# Patient Record
Sex: MALE | Race: WHITE | Employment: OTHER | URBAN - METROPOLITAN AREA
[De-identification: names, ages, dates, MRNs, and addresses within clinical notes are randomized per-mention and may not be internally consistent; named-entity substitution may affect disease eponyms.]

---

## 2023-06-19 ENCOUNTER — HOSPITAL ENCOUNTER (INPATIENT)
Age: 63
LOS: 3 days | Discharge: ANOTHER HOSPITAL | End: 2023-06-23
Attending: STUDENT IN AN ORGANIZED HEALTH CARE EDUCATION/TRAINING PROGRAM | Admitting: INTERNAL MEDICINE

## 2023-06-19 ENCOUNTER — APPOINTMENT (OUTPATIENT)
Dept: CT IMAGING | Age: 63
End: 2023-06-19

## 2023-06-19 ENCOUNTER — APPOINTMENT (OUTPATIENT)
Dept: GENERAL RADIOLOGY | Age: 63
End: 2023-06-19

## 2023-06-19 DIAGNOSIS — R65.21 SEPTIC SHOCK (HCC): ICD-10-CM

## 2023-06-19 DIAGNOSIS — G93.40 ACUTE ENCEPHALOPATHY: ICD-10-CM

## 2023-06-19 DIAGNOSIS — E87.20 LACTIC ACIDOSIS: ICD-10-CM

## 2023-06-19 DIAGNOSIS — J18.9 ATYPICAL PNEUMONIA: ICD-10-CM

## 2023-06-19 DIAGNOSIS — A41.9 SEPTIC SHOCK (HCC): ICD-10-CM

## 2023-06-19 DIAGNOSIS — N17.0 ACUTE RENAL FAILURE WITH TUBULAR NECROSIS (HCC): Primary | ICD-10-CM

## 2023-06-19 DIAGNOSIS — D69.6 THROMBOCYTOPENIA (HCC): ICD-10-CM

## 2023-06-19 LAB
AMORPH SED URNS QL MICRO: ABNORMAL /HPF
APTT BLD: 22.3 SEC (ref 22.7–35.9)
BACTERIA URNS QL MICRO: ABNORMAL /HPF
BASE EXCESS BLDV CALC-SCNC: -2 MMOL/L (ref -3–3)
BILIRUB UR QL STRIP.AUTO: NEGATIVE
CLARITY UR: CLEAR
CO2 BLDV-SCNC: 22 MMOL/L
COARSE GRAN CASTS #/AREA URNS LPF: ABNORMAL /LPF (ref 0–2)
COHGB MFR BLDV: 5.5 % (ref 0–1.5)
COLOR UR: YELLOW
EPI CELLS #/AREA URNS HPF: ABNORMAL /HPF (ref 0–5)
ETHANOLAMINE SERPL-MCNC: NORMAL MG/DL (ref 0–0.08)
FLUAV RNA RESP QL NAA+PROBE: NOT DETECTED
FLUBV RNA RESP QL NAA+PROBE: NOT DETECTED
GLUCOSE BLD-MCNC: 99 MG/DL (ref 70–99)
GLUCOSE UR STRIP.AUTO-MCNC: NEGATIVE MG/DL
HCO3 BLDV-SCNC: 20.6 MMOL/L (ref 23–29)
HGB UR QL STRIP.AUTO: ABNORMAL
INR PPP: 1.06 (ref 0.84–1.16)
KETONES UR STRIP.AUTO-MCNC: NEGATIVE MG/DL
LACTATE BLDV-SCNC: 6.8 MMOL/L (ref 0.4–1.9)
LEUKOCYTE ESTERASE UR QL STRIP.AUTO: ABNORMAL
LIPASE SERPL-CCNC: 20 U/L (ref 13–60)
METHGB MFR BLDV: 0.3 %
NITRITE UR QL STRIP.AUTO: NEGATIVE
NT-PROBNP SERPL-MCNC: 7201 PG/ML (ref 0–124)
O2 THERAPY: ABNORMAL
PCO2 BLDV: 29.4 MMHG (ref 40–50)
PERFORMED ON: NORMAL
PH BLDV: 7.46 [PH] (ref 7.35–7.45)
PH UR STRIP.AUTO: 5.5 [PH] (ref 5–8)
PO2 BLDV: 70.2 MMHG (ref 25–40)
PROCALCITONIN SERPL IA-MCNC: 30.23 NG/ML (ref 0–0.15)
PROT UR STRIP.AUTO-MCNC: >=300 MG/DL
PROTHROMBIN TIME: 13.8 SEC (ref 11.5–14.8)
RBC #/AREA URNS HPF: ABNORMAL /HPF (ref 0–4)
REASON FOR REJECTION: NORMAL
REJECTED TEST: NORMAL
SAO2 % BLDV: 94 %
SARS-COV-2 RNA RESP QL NAA+PROBE: NOT DETECTED
SP GR UR STRIP.AUTO: >=1.03 (ref 1–1.03)
TROPONIN, HIGH SENSITIVITY: 50 NG/L (ref 0–22)
UA COMPLETE W REFLEX CULTURE PNL UR: ABNORMAL
UA DIPSTICK W REFLEX MICRO PNL UR: YES
URN SPEC COLLECT METH UR: ABNORMAL
UROBILINOGEN UR STRIP-ACNC: 0.2 E.U./DL
WBC #/AREA URNS HPF: ABNORMAL /HPF (ref 0–5)

## 2023-06-19 PROCEDURE — 93005 ELECTROCARDIOGRAM TRACING: CPT | Performed by: STUDENT IN AN ORGANIZED HEALTH CARE EDUCATION/TRAINING PROGRAM

## 2023-06-19 PROCEDURE — 96375 TX/PRO/DX INJ NEW DRUG ADDON: CPT

## 2023-06-19 PROCEDURE — 96365 THER/PROPH/DIAG IV INF INIT: CPT

## 2023-06-19 PROCEDURE — 87077 CULTURE AEROBIC IDENTIFY: CPT

## 2023-06-19 PROCEDURE — 82803 BLOOD GASES ANY COMBINATION: CPT

## 2023-06-19 PROCEDURE — 82077 ASSAY SPEC XCP UR&BREATH IA: CPT

## 2023-06-19 PROCEDURE — 83690 ASSAY OF LIPASE: CPT

## 2023-06-19 PROCEDURE — 84145 PROCALCITONIN (PCT): CPT

## 2023-06-19 PROCEDURE — 87040 BLOOD CULTURE FOR BACTERIA: CPT

## 2023-06-19 PROCEDURE — 99285 EMERGENCY DEPT VISIT HI MDM: CPT

## 2023-06-19 PROCEDURE — 83880 ASSAY OF NATRIURETIC PEPTIDE: CPT

## 2023-06-19 PROCEDURE — 85730 THROMBOPLASTIN TIME PARTIAL: CPT

## 2023-06-19 PROCEDURE — 71045 X-RAY EXAM CHEST 1 VIEW: CPT

## 2023-06-19 PROCEDURE — 87181 SC STD AGAR DILUTION PER AGT: CPT

## 2023-06-19 PROCEDURE — 83605 ASSAY OF LACTIC ACID: CPT

## 2023-06-19 PROCEDURE — 87150 DNA/RNA AMPLIFIED PROBE: CPT

## 2023-06-19 PROCEDURE — 85025 COMPLETE CBC W/AUTO DIFF WBC: CPT

## 2023-06-19 PROCEDURE — 70470 CT HEAD/BRAIN W/O & W/DYE: CPT

## 2023-06-19 PROCEDURE — 87449 NOS EACH ORGANISM AG IA: CPT

## 2023-06-19 PROCEDURE — 96376 TX/PRO/DX INJ SAME DRUG ADON: CPT

## 2023-06-19 PROCEDURE — 81001 URINALYSIS AUTO W/SCOPE: CPT

## 2023-06-19 PROCEDURE — 80053 COMPREHEN METABOLIC PANEL: CPT

## 2023-06-19 PROCEDURE — 87636 SARSCOV2 & INF A&B AMP PRB: CPT

## 2023-06-19 PROCEDURE — 84484 ASSAY OF TROPONIN QUANT: CPT

## 2023-06-19 PROCEDURE — 87186 SC STD MICRODIL/AGAR DIL: CPT

## 2023-06-19 PROCEDURE — 85610 PROTHROMBIN TIME: CPT

## 2023-06-19 RX ORDER — ONDANSETRON 2 MG/ML
4 INJECTION INTRAMUSCULAR; INTRAVENOUS ONCE
Status: COMPLETED | OUTPATIENT
Start: 2023-06-19 | End: 2023-06-20

## 2023-06-19 RX ORDER — ACETAMINOPHEN 500 MG
1000 TABLET ORAL ONCE
Status: COMPLETED | OUTPATIENT
Start: 2023-06-19 | End: 2023-06-20

## 2023-06-19 RX ORDER — SODIUM CHLORIDE, SODIUM LACTATE, POTASSIUM CHLORIDE, AND CALCIUM CHLORIDE .6; .31; .03; .02 G/100ML; G/100ML; G/100ML; G/100ML
2500 INJECTION, SOLUTION INTRAVENOUS ONCE
Status: COMPLETED | OUTPATIENT
Start: 2023-06-19 | End: 2023-06-20

## 2023-06-19 ASSESSMENT — PAIN - FUNCTIONAL ASSESSMENT: PAIN_FUNCTIONAL_ASSESSMENT: NONE - DENIES PAIN

## 2023-06-20 PROBLEM — R45.1 AGITATION REQUIRING SEDATION PROTOCOL: Status: ACTIVE | Noted: 2023-06-20

## 2023-06-20 PROBLEM — R65.21 SEPTIC SHOCK (HCC): Status: ACTIVE | Noted: 2023-06-20

## 2023-06-20 PROBLEM — J96.01 ACUTE RESPIRATORY FAILURE WITH HYPOXIA (HCC): Status: ACTIVE | Noted: 2023-06-20

## 2023-06-20 PROBLEM — B95.3 BACTEREMIA DUE TO STREPTOCOCCUS PNEUMONIAE: Status: ACTIVE | Noted: 2023-06-20

## 2023-06-20 PROBLEM — R91.8 PULMONARY INFILTRATES: Status: ACTIVE | Noted: 2023-06-20

## 2023-06-20 PROBLEM — J13 PNEUMONIA OF BOTH LUNGS DUE TO STREPTOCOCCUS PNEUMONIAE (HCC): Status: ACTIVE | Noted: 2023-06-20

## 2023-06-20 PROBLEM — G93.40 ACUTE ENCEPHALOPATHY: Status: ACTIVE | Noted: 2023-06-20

## 2023-06-20 PROBLEM — R78.81 BACTEREMIA DUE TO STREPTOCOCCUS PNEUMONIAE: Status: ACTIVE | Noted: 2023-06-20

## 2023-06-20 PROBLEM — E44.0 MODERATE PROTEIN-CALORIE MALNUTRITION (HCC): Status: ACTIVE | Noted: 2023-06-20

## 2023-06-20 PROBLEM — N17.9 AKI (ACUTE KIDNEY INJURY) (HCC): Status: ACTIVE | Noted: 2023-06-20

## 2023-06-20 PROBLEM — N17.0 ACUTE RENAL FAILURE WITH TUBULAR NECROSIS (HCC): Status: ACTIVE | Noted: 2023-06-20

## 2023-06-20 PROBLEM — A41.9 SEPSIS WITH ACUTE ORGAN DYSFUNCTION WITHOUT SEPTIC SHOCK, DUE TO UNSPECIFIED ORGANISM, UNSPECIFIED TYPE (HCC): Status: ACTIVE | Noted: 2023-06-20

## 2023-06-20 PROBLEM — D69.6 THROMBOCYTOPENIA (HCC): Status: ACTIVE | Noted: 2023-06-20

## 2023-06-20 PROBLEM — R74.02 ELEVATED LDH: Status: ACTIVE | Noted: 2023-06-20

## 2023-06-20 PROBLEM — R79.89 ELEVATED PROCALCITONIN: Status: ACTIVE | Noted: 2023-06-20

## 2023-06-20 PROBLEM — R65.20 SEPSIS WITH ACUTE ORGAN DYSFUNCTION WITHOUT SEPTIC SHOCK, DUE TO UNSPECIFIED ORGANISM, UNSPECIFIED TYPE (HCC): Status: ACTIVE | Noted: 2023-06-20

## 2023-06-20 PROBLEM — E87.20 LACTIC ACIDOSIS: Status: ACTIVE | Noted: 2023-06-20

## 2023-06-20 PROBLEM — D50.9 MICROCYTIC HYPOCHROMIC ANEMIA: Status: ACTIVE | Noted: 2023-06-20

## 2023-06-20 PROBLEM — R79.89 ELEVATED BRAIN NATRIURETIC PEPTIDE (BNP) LEVEL: Status: ACTIVE | Noted: 2023-06-20

## 2023-06-20 LAB
ALBUMIN SERPL-MCNC: 2.5 G/DL (ref 3.4–5)
ALBUMIN SERPL-MCNC: 2.7 G/DL (ref 3.4–5)
ALBUMIN/GLOB SERPL: 0.4 {RATIO} (ref 1.1–2.2)
ALP SERPL-CCNC: 93 U/L (ref 40–129)
ALT SERPL-CCNC: 18 U/L (ref 10–40)
AMMONIA PLAS-SCNC: 87 UMOL/L (ref 16–60)
ANION GAP SERPL CALCULATED.3IONS-SCNC: 16 MMOL/L (ref 3–16)
ANION GAP SERPL CALCULATED.3IONS-SCNC: 22 MMOL/L (ref 3–16)
APPEARANCE BRONCH: ABNORMAL
AST SERPL-CCNC: 64 U/L (ref 15–37)
BASOPHILS # BLD: 0 K/UL (ref 0–0.2)
BASOPHILS NFR BLD: 0.2 %
BILIRUB SERPL-MCNC: 1.2 MG/DL (ref 0–1)
BUN SERPL-MCNC: 52 MG/DL (ref 7–20)
BUN SERPL-MCNC: 53 MG/DL (ref 7–20)
CALCIUM SERPL-MCNC: 10.1 MG/DL (ref 8.3–10.6)
CALCIUM SERPL-MCNC: 9.1 MG/DL (ref 8.3–10.6)
CHLORIDE SERPL-SCNC: 92 MMOL/L (ref 99–110)
CHLORIDE SERPL-SCNC: 98 MMOL/L (ref 99–110)
CLOT SPEC QL: ABNORMAL
CO2 SERPL-SCNC: 19 MMOL/L (ref 21–32)
CO2 SERPL-SCNC: 23 MMOL/L (ref 21–32)
COLOR BRONCH: ABNORMAL
CREAT SERPL-MCNC: 1.6 MG/DL (ref 0.8–1.3)
CREAT SERPL-MCNC: 1.6 MG/DL (ref 0.8–1.3)
DEPRECATED RDW RBC AUTO: 18.9 % (ref 12.4–15.4)
EKG ATRIAL RATE: 105 BPM
EKG DIAGNOSIS: NORMAL
EKG P AXIS: 85 DEGREES
EKG P-R INTERVAL: 142 MS
EKG Q-T INTERVAL: 330 MS
EKG QRS DURATION: 86 MS
EKG QTC CALCULATION (BAZETT): 436 MS
EKG R AXIS: 69 DEGREES
EKG T AXIS: 66 DEGREES
EKG VENTRICULAR RATE: 105 BPM
EOSINOPHIL # BLD: 0 K/UL (ref 0–0.6)
EOSINOPHIL NFR BLD: 0 %
FERRITIN SERPL IA-MCNC: 4633 NG/ML (ref 30–400)
FOLATE SERPL-MCNC: 12.13 NG/ML (ref 4.78–24.2)
GFR SERPLBLD CREATININE-BSD FMLA CKD-EPI: 48 ML/MIN/{1.73_M2}
GFR SERPLBLD CREATININE-BSD FMLA CKD-EPI: 48 ML/MIN/{1.73_M2}
GLUCOSE BLD-MCNC: 152 MG/DL (ref 70–99)
GLUCOSE SERPL-MCNC: 106 MG/DL (ref 70–99)
GLUCOSE SERPL-MCNC: 94 MG/DL (ref 70–99)
HCT VFR BLD AUTO: 30.6 % (ref 40.5–52.5)
HCT VFR BLD AUTO: 35.3 % (ref 40.5–52.5)
HGB BLD-MCNC: 11.4 G/DL (ref 13.5–17.5)
IMMATURE RETIC FRACT: 0.35 (ref 0.21–0.37)
INR PPP: 1.3 (ref 0.84–1.16)
IRON SATN MFR SERPL: 10 % (ref 20–50)
IRON SERPL-MCNC: 21 UG/DL (ref 59–158)
LACTATE BLDV-SCNC: 0.3 MMOL/L (ref 0.4–1.9)
LACTATE BLDV-SCNC: 2.7 MMOL/L (ref 0.4–2)
LACTATE BLDV-SCNC: 3.3 MMOL/L (ref 0.4–2)
LACTATE BLDV-SCNC: 4.5 MMOL/L (ref 0.4–2)
LDH SERPL L TO P-CCNC: 609 U/L (ref 100–190)
LEGIONELLA AG UR QL: NORMAL
LYMPHOCYTES # BLD: 0.2 K/UL (ref 1–5.1)
LYMPHOCYTES NFR BLD: 2.1 %
LYMPHOCYTES NFR BRONCH MANUAL: 1 % (ref 5–10)
MACROPHAGES NFR BRONCH MANUAL: 7 % (ref 90–95)
MAGNESIUM SERPL-MCNC: 2.4 MG/DL (ref 1.8–2.4)
MCH RBC QN AUTO: 23.2 PG (ref 26–34)
MCHC RBC AUTO-ENTMCNC: 32.2 G/DL (ref 31–36)
MCV RBC AUTO: 72 FL (ref 80–100)
MONOCYTES # BLD: 0 K/UL (ref 0–1.3)
MONOCYTES NFR BLD: 0.3 %
NEUTROPHILS # BLD: 7 K/UL (ref 1.7–7.7)
NEUTROPHILS NFR BLD: 97.4 %
NEUTROPHILS NFR BRONCH MANUAL: 92 % (ref 5–10)
NUC CELL # BRONCH MANUAL: 2800 /CUMM
PATH REV: YES
PERFORMED ON: ABNORMAL
PHOSPHATE SERPL-MCNC: 3.7 MG/DL (ref 2.5–4.9)
PHOSPHATE SERPL-MCNC: 3.9 MG/DL (ref 2.5–4.9)
PLATELET # BLD AUTO: 61 K/UL (ref 135–450)
PLATELET BLD QL SMEAR: ABNORMAL
PMV BLD AUTO: 9.3 FL (ref 5–10.5)
POTASSIUM SERPL-SCNC: 3.3 MMOL/L (ref 3.5–5.1)
POTASSIUM SERPL-SCNC: 4 MMOL/L (ref 3.5–5.1)
PROT SERPL-MCNC: 9.1 G/DL (ref 6.4–8.2)
PROTHROMBIN TIME: 16.1 SEC (ref 11.5–14.8)
RBC # BLD AUTO: 4.91 M/UL (ref 4.2–5.9)
RBC # FLD MANUAL: 356 /CUMM
REPORT: NORMAL
RETICS # AUTO: 0.02 M/UL
RETICS/RBC NFR AUTO: 0.57 % (ref 0.5–2.18)
S PNEUM AG UR QL: ABNORMAL
SLIDE REVIEW: ABNORMAL
SODIUM SERPL-SCNC: 133 MMOL/L (ref 136–145)
SODIUM SERPL-SCNC: 137 MMOL/L (ref 136–145)
TIBC SERPL-MCNC: 209 UG/DL (ref 260–445)
TOTAL CELLS COUNTED BRONCH: 100
TROPONIN, HIGH SENSITIVITY: 53 NG/L (ref 0–22)
TSH SERPL DL<=0.005 MIU/L-ACNC: 0.59 UIU/ML (ref 0.27–4.2)
VIT B12 SERPL-MCNC: 411 PG/ML (ref 211–911)
WBC # BLD AUTO: 7.2 K/UL (ref 4–11)

## 2023-06-20 PROCEDURE — 82140 ASSAY OF AMMONIA: CPT

## 2023-06-20 PROCEDURE — 82728 ASSAY OF FERRITIN: CPT

## 2023-06-20 PROCEDURE — 84443 ASSAY THYROID STIM HORMONE: CPT

## 2023-06-20 PROCEDURE — 89051 BODY FLUID CELL COUNT: CPT

## 2023-06-20 PROCEDURE — 87205 SMEAR GRAM STAIN: CPT

## 2023-06-20 PROCEDURE — 6360000002 HC RX W HCPCS: Performed by: INTERNAL MEDICINE

## 2023-06-20 PROCEDURE — 36415 COLL VENOUS BLD VENIPUNCTURE: CPT

## 2023-06-20 PROCEDURE — 3609010800 HC BRONCHOSCOPY ALVEOLAR LAVAGE: Performed by: INTERNAL MEDICINE

## 2023-06-20 PROCEDURE — 0B968ZX DRAINAGE OF RIGHT LOWER LOBE BRONCHUS, VIA NATURAL OR ARTIFICIAL OPENING ENDOSCOPIC, DIAGNOSTIC: ICD-10-PCS | Performed by: INTERNAL MEDICINE

## 2023-06-20 PROCEDURE — C9113 INJ PANTOPRAZOLE SODIUM, VIA: HCPCS | Performed by: INTERNAL MEDICINE

## 2023-06-20 PROCEDURE — 83735 ASSAY OF MAGNESIUM: CPT

## 2023-06-20 PROCEDURE — 87632 RESP VIRUS 6-11 TARGETS: CPT

## 2023-06-20 PROCEDURE — 99223 1ST HOSP IP/OBS HIGH 75: CPT | Performed by: INTERNAL MEDICINE

## 2023-06-20 PROCEDURE — 51702 INSERT TEMP BLADDER CATH: CPT

## 2023-06-20 PROCEDURE — 87206 SMEAR FLUORESCENT/ACID STAI: CPT

## 2023-06-20 PROCEDURE — 2500000003 HC RX 250 WO HCPCS: Performed by: STUDENT IN AN ORGANIZED HEALTH CARE EDUCATION/TRAINING PROGRAM

## 2023-06-20 PROCEDURE — 87116 MYCOBACTERIA CULTURE: CPT

## 2023-06-20 PROCEDURE — 93010 ELECTROCARDIOGRAM REPORT: CPT | Performed by: INTERNAL MEDICINE

## 2023-06-20 PROCEDURE — 87106 FUNGI IDENTIFICATION YEAST: CPT

## 2023-06-20 PROCEDURE — 83540 ASSAY OF IRON: CPT

## 2023-06-20 PROCEDURE — 3609010900 HC BRONCHOSCOPY THERAPUTIC ASPIRATION INITIAL: Performed by: INTERNAL MEDICINE

## 2023-06-20 PROCEDURE — 84484 ASSAY OF TROPONIN QUANT: CPT

## 2023-06-20 PROCEDURE — 2500000003 HC RX 250 WO HCPCS: Performed by: INTERNAL MEDICINE

## 2023-06-20 PROCEDURE — 80069 RENAL FUNCTION PANEL: CPT

## 2023-06-20 PROCEDURE — 87070 CULTURE OTHR SPECIMN AEROBIC: CPT

## 2023-06-20 PROCEDURE — 2700000000 HC OXYGEN THERAPY PER DAY

## 2023-06-20 PROCEDURE — 83615 LACTATE (LD) (LDH) ENZYME: CPT

## 2023-06-20 PROCEDURE — 85045 AUTOMATED RETICULOCYTE COUNT: CPT

## 2023-06-20 PROCEDURE — 94761 N-INVAS EAR/PLS OXIMETRY MLT: CPT

## 2023-06-20 PROCEDURE — 87102 FUNGUS ISOLATION CULTURE: CPT

## 2023-06-20 PROCEDURE — 83550 IRON BINDING TEST: CPT

## 2023-06-20 PROCEDURE — 84100 ASSAY OF PHOSPHORUS: CPT

## 2023-06-20 PROCEDURE — 82607 VITAMIN B-12: CPT

## 2023-06-20 PROCEDURE — 6360000002 HC RX W HCPCS: Performed by: STUDENT IN AN ORGANIZED HEALTH CARE EDUCATION/TRAINING PROGRAM

## 2023-06-20 PROCEDURE — 2580000003 HC RX 258: Performed by: INTERNAL MEDICINE

## 2023-06-20 PROCEDURE — 88305 TISSUE EXAM BY PATHOLOGIST: CPT

## 2023-06-20 PROCEDURE — 87641 MR-STAPH DNA AMP PROBE: CPT

## 2023-06-20 PROCEDURE — 6370000000 HC RX 637 (ALT 250 FOR IP): Performed by: INTERNAL MEDICINE

## 2023-06-20 PROCEDURE — 6360000004 HC RX CONTRAST MEDICATION: Performed by: STUDENT IN AN ORGANIZED HEALTH CARE EDUCATION/TRAINING PROGRAM

## 2023-06-20 PROCEDURE — 88112 CYTOPATH CELL ENHANCE TECH: CPT

## 2023-06-20 PROCEDURE — 83605 ASSAY OF LACTIC ACID: CPT

## 2023-06-20 PROCEDURE — 2709999900 HC NON-CHARGEABLE SUPPLY: Performed by: INTERNAL MEDICINE

## 2023-06-20 PROCEDURE — 86360 T CELL ABSOLUTE COUNT/RATIO: CPT

## 2023-06-20 PROCEDURE — 82746 ASSAY OF FOLIC ACID SERUM: CPT

## 2023-06-20 PROCEDURE — 87536 HIV-1 QUANT&REVRSE TRNSCRPJ: CPT

## 2023-06-20 PROCEDURE — 6370000000 HC RX 637 (ALT 250 FOR IP): Performed by: STUDENT IN AN ORGANIZED HEALTH CARE EDUCATION/TRAINING PROGRAM

## 2023-06-20 PROCEDURE — 2580000003 HC RX 258: Performed by: STUDENT IN AN ORGANIZED HEALTH CARE EDUCATION/TRAINING PROGRAM

## 2023-06-20 PROCEDURE — 2000000000 HC ICU R&B

## 2023-06-20 PROCEDURE — 99291 CRITICAL CARE FIRST HOUR: CPT | Performed by: INTERNAL MEDICINE

## 2023-06-20 PROCEDURE — 85610 PROTHROMBIN TIME: CPT

## 2023-06-20 RX ORDER — METHYLPREDNISOLONE SODIUM SUCCINATE 40 MG/ML
40 INJECTION, POWDER, LYOPHILIZED, FOR SOLUTION INTRAMUSCULAR; INTRAVENOUS DAILY
Status: DISCONTINUED | OUTPATIENT
Start: 2023-06-20 | End: 2023-06-20

## 2023-06-20 RX ORDER — LORAZEPAM 2 MG/ML
1 INJECTION INTRAMUSCULAR ONCE
Status: COMPLETED | OUTPATIENT
Start: 2023-06-20 | End: 2023-06-20

## 2023-06-20 RX ORDER — POTASSIUM CHLORIDE 29.8 MG/ML
20 INJECTION INTRAVENOUS PRN
Status: DISCONTINUED | OUTPATIENT
Start: 2023-06-20 | End: 2023-06-20

## 2023-06-20 RX ORDER — DEXMEDETOMIDINE HYDROCHLORIDE 4 UG/ML
.1-1.5 INJECTION, SOLUTION INTRAVENOUS CONTINUOUS
Status: DISCONTINUED | OUTPATIENT
Start: 2023-06-20 | End: 2023-06-24 | Stop reason: HOSPADM

## 2023-06-20 RX ORDER — ONDANSETRON 2 MG/ML
4 INJECTION INTRAMUSCULAR; INTRAVENOUS EVERY 6 HOURS PRN
Status: DISCONTINUED | OUTPATIENT
Start: 2023-06-20 | End: 2023-06-24 | Stop reason: HOSPADM

## 2023-06-20 RX ORDER — PANTOPRAZOLE SODIUM 40 MG/10ML
40 INJECTION, POWDER, LYOPHILIZED, FOR SOLUTION INTRAVENOUS DAILY
Status: DISCONTINUED | OUTPATIENT
Start: 2023-06-20 | End: 2023-06-23

## 2023-06-20 RX ORDER — ACETAMINOPHEN 650 MG/1
650 SUPPOSITORY RECTAL EVERY 4 HOURS PRN
Status: DISCONTINUED | OUTPATIENT
Start: 2023-06-20 | End: 2023-06-24 | Stop reason: HOSPADM

## 2023-06-20 RX ORDER — DEXAMETHASONE SODIUM PHOSPHATE 10 MG/ML
10 INJECTION INTRAMUSCULAR; INTRAVENOUS EVERY 6 HOURS
Status: DISCONTINUED | OUTPATIENT
Start: 2023-06-20 | End: 2023-06-23

## 2023-06-20 RX ORDER — MAGNESIUM SULFATE 1 G/100ML
1000 INJECTION INTRAVENOUS PRN
Status: DISCONTINUED | OUTPATIENT
Start: 2023-06-20 | End: 2023-06-24 | Stop reason: HOSPADM

## 2023-06-20 RX ORDER — DEXAMETHASONE SODIUM PHOSPHATE 4 MG/ML
8 INJECTION, SOLUTION INTRA-ARTICULAR; INTRALESIONAL; INTRAMUSCULAR; INTRAVENOUS; SOFT TISSUE ONCE
Status: COMPLETED | OUTPATIENT
Start: 2023-06-20 | End: 2023-06-20

## 2023-06-20 RX ORDER — SODIUM CHLORIDE, SODIUM LACTATE, POTASSIUM CHLORIDE, CALCIUM CHLORIDE 600; 310; 30; 20 MG/100ML; MG/100ML; MG/100ML; MG/100ML
INJECTION, SOLUTION INTRAVENOUS CONTINUOUS
Status: ACTIVE | OUTPATIENT
Start: 2023-06-20 | End: 2023-06-21

## 2023-06-20 RX ORDER — ACETAMINOPHEN 325 MG/1
650 TABLET ORAL EVERY 4 HOURS PRN
Status: DISCONTINUED | OUTPATIENT
Start: 2023-06-20 | End: 2023-06-24 | Stop reason: HOSPADM

## 2023-06-20 RX ORDER — IPRATROPIUM BROMIDE AND ALBUTEROL SULFATE 2.5; .5 MG/3ML; MG/3ML
1 SOLUTION RESPIRATORY (INHALATION) EVERY 4 HOURS PRN
Status: DISCONTINUED | OUTPATIENT
Start: 2023-06-20 | End: 2023-06-24 | Stop reason: HOSPADM

## 2023-06-20 RX ORDER — POTASSIUM CHLORIDE 7.45 MG/ML
10 INJECTION INTRAVENOUS PRN
Status: DISCONTINUED | OUTPATIENT
Start: 2023-06-20 | End: 2023-06-24 | Stop reason: HOSPADM

## 2023-06-20 RX ADMIN — ACETAMINOPHEN 1000 MG: 500 TABLET ORAL at 00:10

## 2023-06-20 RX ADMIN — DEXAMETHASONE SODIUM PHOSPHATE 8 MG: 4 INJECTION, SOLUTION INTRAMUSCULAR; INTRAVENOUS at 01:16

## 2023-06-20 RX ADMIN — MUPIROCIN: 20 OINTMENT TOPICAL at 11:15

## 2023-06-20 RX ADMIN — SULFAMETHOXAZOLE AND TRIMETHOPRIM 342.4 MG: 80; 16 INJECTION, SOLUTION, CONCENTRATE INTRAVENOUS at 11:45

## 2023-06-20 RX ADMIN — CEFEPIME 2000 MG: 2 INJECTION, POWDER, FOR SOLUTION INTRAVENOUS at 01:11

## 2023-06-20 RX ADMIN — CEFTRIAXONE SODIUM 2000 MG: 2 INJECTION, POWDER, FOR SOLUTION INTRAMUSCULAR; INTRAVENOUS at 13:54

## 2023-06-20 RX ADMIN — SODIUM CHLORIDE, POTASSIUM CHLORIDE, SODIUM LACTATE AND CALCIUM CHLORIDE: 600; 310; 30; 20 INJECTION, SOLUTION INTRAVENOUS at 06:40

## 2023-06-20 RX ADMIN — SODIUM CHLORIDE, POTASSIUM CHLORIDE, SODIUM LACTATE AND CALCIUM CHLORIDE 2500 ML: 600; 310; 30; 20 INJECTION, SOLUTION INTRAVENOUS at 00:14

## 2023-06-20 RX ADMIN — LORAZEPAM 1 MG: 2 INJECTION INTRAMUSCULAR; INTRAVENOUS at 01:20

## 2023-06-20 RX ADMIN — ACETAMINOPHEN 650 MG: 650 SUPPOSITORY RECTAL at 08:07

## 2023-06-20 RX ADMIN — VANCOMYCIN HYDROCHLORIDE 500 MG: 500 INJECTION, POWDER, LYOPHILIZED, FOR SOLUTION INTRAVENOUS at 14:24

## 2023-06-20 RX ADMIN — DEXAMETHASONE SODIUM PHOSPHATE 10 MG: 10 INJECTION INTRAMUSCULAR; INTRAVENOUS at 16:07

## 2023-06-20 RX ADMIN — SULFAMETHOXAZOLE AND TRIMETHOPRIM 342.4 MG: 80; 16 INJECTION, SOLUTION, CONCENTRATE INTRAVENOUS at 03:45

## 2023-06-20 RX ADMIN — DEXAMETHASONE SODIUM PHOSPHATE 10 MG: 10 INJECTION INTRAMUSCULAR; INTRAVENOUS at 22:08

## 2023-06-20 RX ADMIN — IOPAMIDOL 75 ML: 755 INJECTION, SOLUTION INTRAVENOUS at 00:03

## 2023-06-20 RX ADMIN — Medication 0.2 MCG/KG/HR: at 05:10

## 2023-06-20 RX ADMIN — METHYLPREDNISOLONE SODIUM SUCCINATE 40 MG: 40 INJECTION INTRAMUSCULAR; INTRAVENOUS at 14:11

## 2023-06-20 RX ADMIN — SODIUM CHLORIDE, POTASSIUM CHLORIDE, SODIUM LACTATE AND CALCIUM CHLORIDE: 600; 310; 30; 20 INJECTION, SOLUTION INTRAVENOUS at 15:00

## 2023-06-20 RX ADMIN — MUPIROCIN: 20 OINTMENT TOPICAL at 22:08

## 2023-06-20 RX ADMIN — ONDANSETRON 4 MG: 2 INJECTION INTRAMUSCULAR; INTRAVENOUS at 00:15

## 2023-06-20 RX ADMIN — VANCOMYCIN HYDROCHLORIDE 1500 MG: 10 INJECTION, POWDER, LYOPHILIZED, FOR SOLUTION INTRAVENOUS at 02:05

## 2023-06-20 RX ADMIN — LORAZEPAM 1 MG: 2 INJECTION INTRAMUSCULAR; INTRAVENOUS at 02:40

## 2023-06-20 RX ADMIN — PANTOPRAZOLE SODIUM 40 MG: 40 INJECTION, POWDER, FOR SOLUTION INTRAVENOUS at 14:02

## 2023-06-20 RX ADMIN — Medication 0.8 MCG/KG/HR: at 12:37

## 2023-06-20 ASSESSMENT — PAIN SCALES - GENERAL
PAINLEVEL_OUTOF10: 0
PAINLEVEL_OUTOF10: 2

## 2023-06-20 NOTE — PLAN OF CARE
Problem: Discharge Planning  Goal: Discharge to home or other facility with appropriate resources  Outcome: Progressing     Problem: Pain  Goal: Verbalizes/displays adequate comfort level or baseline comfort level  Outcome: Progressing     Problem: Safety - Medical Restraint  Goal: Remains free of injury from restraints (Restraint for Interference with Medical Device)  Description: INTERVENTIONS:  1. Determine that other, less restrictive measures have been tried or would not be effective before applying the restraint  2. Evaluate the patient's condition at the time of restraint application  3. Inform patient/family regarding the reason for restraint  4. Q2H: Monitor safety, psychosocial status, comfort, nutrition and hydration  Outcome: Progressing  Taken 6/20/2023 0800  Remains free of injury from restraints (restraint for interference with medical device):   Determine that other, less restrictive measures have been tried or would not be effective before applying the restraint   Evaluate the patient's condition at the time of restraint application   Inform patient/family regarding the reason for restraint   Every 2 hours: Monitor safety, psychosocial status, comfort, nutrition and hydration     Problem: Skin/Tissue Integrity  Goal: Absence of new skin breakdown  Description: 1. Monitor for areas of redness and/or skin breakdown  2. Assess vascular access sites hourly  3. Every 4-6 hours minimum:  Change oxygen saturation probe site  4. Every 4-6 hours:  If on nasal continuous positive airway pressure, respiratory therapy assess nares and determine need for appliance change or resting period.   Outcome: Progressing     Problem: Safety - Adult  Goal: Free from fall injury  Outcome: Progressing     Jose Alvarenga RN

## 2023-06-20 NOTE — PROCEDURES
Bronchoscopy note    ASA 2, Mallampati 4    Patient with sepsis, pulm infiltrates, acute respiratory failure with hypoxemia, history of HIV and given the patient clinical status and the data available it was decided to do a bronchoscopy and patient's family were told about the procedure along with the pros and cons and after getting informed consent, the endoscopy team was requested to come to the bedside, patient was already on IV Precedex to decrease the agitation and did not require any sedation for the procedure, the bronchoscope was introduced to the mouth using a bite block, patient had some bloody secretions in the posterior pharynx which was suctioned out, patient's vocal cords were normal, patient was found to have thick mucopurulent secretions which are blocking the right lower lobe bronchus along with that patient also had some secretion in the right middle lobe and left lower lobe, the bronchoscope was wedged into the right lower lobe bronchus and BAL was done from that area which was sent for various culture and cytology, patient tolerated the procedure well and did not have any apparent complications    Estimated blood loss because of the procedure was 0    Bronchoscopy findings were discussed with patient's family    Further management depending on patient clinical status and the bronchoscopy results    Jack Waite MD

## 2023-06-20 NOTE — RT PROTOCOL NOTE
minute or more, then use the equivalent nebulizer order(s) with same Frequency and PRN reasons based on the score. If a patient is on this medication at home then do not decrease Frequency below that used at home. 0-3 - enter or revise RT bronchodilator order(s) to equivalent RT Bronchodilator order with Frequency of every 4 hours PRN for wheezing or increased work of breathing using Per Protocol order mode. 4-6 - enter or revise RT Bronchodilator order(s) to two equivalent RT bronchodilator orders with one order with BID Frequency and one order with Frequency of every 4 hours PRN wheezing or increased work of breathing using Per Protocol order mode. 7-10 - enter or revise RT Bronchodilator order(s) to two equivalent RT bronchodilator orders with one order with TID Frequency and one order with Frequency of every 4 hours PRN wheezing or increased work of breathing using Per Protocol order mode. 11-13 - enter or revise RT Bronchodilator order(s) to one equivalent RT bronchodilator order with QID Frequency and an Albuterol order with Frequency of every 4 hours PRN wheezing or increased work of breathing using Per Protocol order mode. Greater than 13 - enter or revise RT Bronchodilator order(s) to one equivalent RT bronchodilator order with every 4 hours Frequency and an Albuterol order with Frequency of every 2 hours PRN wheezing or increased work of breathing using Per Protocol order mode. RT to enter RT Home Evaluation for COPD & MDI Assessment order using Per Protocol order mode.     Electronically signed by Rosalio Shelton RCP on 6/20/2023 at 1:05 PM

## 2023-06-20 NOTE — ED NOTES
Attempted to obtain an ABG on pt with charge nurse. Pt was fighting against the ABG; Writer sent down a small sample of blood. Wasn't enough for sample. Provider was notified.      Robbin Daniel RN  06/20/23 0246       Robbin Daniel RN  06/20/23 0046

## 2023-06-20 NOTE — ED PROVIDER NOTES
intracerebral abscess, other acute infectious process. This is negative. Patient is not significantly meningitic on exam, has evidence of multifocal pneumonia, is likely etiology and source of the patient's symptoms. He is covered broadly with IV antibiotics to cover the likely etiologies of his symptoms. Admitted to hospitalist.      Disposition Considerations (tests considered but not done, Shared Decision Making, Pt Expectation of Test or Tx.):   Considered LP but with likely source of multifocal pneumonia, acute renal failure, other potential causes for encephalopathy, his thrombocytopenia, the risks likely outweigh the benefits of obtaining this at this current juncture. Modern ISOosmolar CT contrast media, used in the ED, is not thought to cause any change in creatinine or kidney injury at this time. Benefits of accurate and timely diagnosis of a potentially debilitating medical condition far outweigh the theoretical risks of transient rise in creatinine. Transient rise in creatinine have not been causally linked to administration of modern intravenous contrast media, nor has administration of said contrast been shown to be tied to a patient centered outcomes such as need for dialysis in any known literature. Cited below are recent papers on the subject from both the Annals of Emergency Medicine and Radiology:    Karina RD, Carleen LM, Donya JL, [facility] [place] Marcia, Schoenfeld EM, Dante RR. Acute Kidney Injury After Computed Tomography: A [place]-analysis. [address] 2018 Jan;71(1):44-53.e4. doi: 10.1016/j.annemergmed. 2017.06.041. Epub 2017 Aug 12. PMID: 48120661. Anne Marie Johns, Nahum RJ, Valentino EE, Arely DF. Is Intravenous Administration of Iodixanol Associated with Increased Risk of Acute Kidney Injury, Dialysis, or Mortality? A Propensity Score-adjusted Study. Radiology. 2017 Nov;285(2):414-424. doi: 10.1148/radiol. [id number]. Epub 2017 Jul 13. PMID: 64566488.     Elizabeth Mcbride

## 2023-06-20 NOTE — H&P
History and Physical    Hospital Medicine History & Physical      Patient: Monica Weir  :  1960  MRN:  [id number]    Date of Service: 23    Chief Complaint   Patient presents with    Fever     Wife at  states patient had a fever this AM of 102, pt was given tylenol and motrin around 6am. Wife also report emesis and feels like patient has SOB. Pt denies any chest pain. HISTORY OF PRESENT ILLNESS:    Monica Weir is a 61 y.o. male. He presented to the ER from home by private vehicle. His wife or significant other transported him to the hospital but is no longer present. His daughter is at bedside. Per her report the patient been febrile and increasingly ill since this past Thursday. She called to talk to him several times. Each time she called he seemed increasingly drowsy and confused. At baseline daughter relates the patient is highly functional and independent. She believes he has an advance directive and living will, but unsure who the patient has designated as an [facility]. She is uncertain about whether patient is actually formally  right now. He was previously , then  for a long time, and then just recently moved back in. Patient has a longstanding h/o HIV diagnosed prior to . He has not been taking HAART for at least the past ten years. Review of Systems:  Unobtainable due to patient's depressed mental status. Past Medical History:   Diagnosis Date    HIV (human immunodeficiency virus infection) ([facility] [address]     Hydrocephalus ([facility] [address]        Past Surgical History:   Procedure Laterality Date    HERNIA REPAIR           Outpatient Medications  Patient is not known to be taking any medications    Allergies:   Patient has no known allergies. Social:   reports that he has been smoking. He does not have any smokeless tobacco history on file. reports current alcohol use.   Social History     Substance and Sexual Activity   Drug Use Not on

## 2023-06-20 NOTE — CARE COORDINATION
Per: Nehal Dowling, I spoke to wife and daughter. Not sure what the outcome will be at this point. He has no income but both wife and daughter state pt has always refused medicaid or any wili.  They want to discuss again once he is able to decide for himself if he is willing to apply for any assistance     Teresa Aayla RN

## 2023-06-21 LAB
ACID FAST STN SPEC QL: NORMAL
ALBUMIN SERPL-MCNC: 1.6 G/DL (ref 3.4–5)
AMMONIA PLAS-SCNC: 64 UMOL/L (ref 16–60)
ANION GAP SERPL CALCULATED.3IONS-SCNC: 13 MMOL/L (ref 3–16)
BASOPHILS # BLD: 0 K/UL (ref 0–0.2)
BASOPHILS NFR BLD: 0 %
BUN SERPL-MCNC: 77 MG/DL (ref 7–20)
C3 SERPL-MCNC: 101.2 MG/DL (ref 90–180)
C4 SERPL-MCNC: 11.3 MG/DL (ref 10–40)
CALCIUM SERPL-MCNC: 9.3 MG/DL (ref 8.3–10.6)
CHLORIDE SERPL-SCNC: 103 MMOL/L (ref 99–110)
CK SERPL-CCNC: 356 U/L (ref 39–308)
CO2 SERPL-SCNC: 18 MMOL/L (ref 21–32)
CREAT SERPL-MCNC: 2 MG/DL (ref 0.8–1.3)
DEPRECATED RDW RBC AUTO: 18.8 % (ref 12.4–15.4)
EOSINOPHIL # BLD: 0 K/UL (ref 0–0.6)
EOSINOPHIL NFR BLD: 0.1 %
GFR SERPLBLD CREATININE-BSD FMLA CKD-EPI: 37 ML/MIN/{1.73_M2}
GLUCOSE SERPL-MCNC: 119 MG/DL (ref 70–99)
HCT VFR BLD AUTO: 30.4 % (ref 40.5–52.5)
HGB BLD-MCNC: 9.7 G/DL (ref 13.5–17.5)
INR PPP: 1.33 (ref 0.84–1.16)
LACTATE BLDV-SCNC: 2.2 MMOL/L (ref 0.4–2)
LYMPHOCYTES # BLD: 0.4 K/UL (ref 1–5.1)
LYMPHOCYTES NFR BLD: 1.7 %
MAGNESIUM SERPL-MCNC: 2.8 MG/DL (ref 1.8–2.4)
MCH RBC QN AUTO: 23 PG (ref 26–34)
MCHC RBC AUTO-ENTMCNC: 31.9 G/DL (ref 31–36)
MCV RBC AUTO: 72 FL (ref 80–100)
MONOCYTES # BLD: 0.1 K/UL (ref 0–1.3)
MONOCYTES NFR BLD: 0.5 %
MRSA DNA SPEC QL NAA+PROBE: NORMAL
NEUTROPHILS # BLD: 21 K/UL (ref 1.7–7.7)
NEUTROPHILS NFR BLD: 97.7 %
PATH CONSULT FLUID: NORMAL
PHOSPHATE SERPL-MCNC: 6 MG/DL (ref 2.5–4.9)
PHOSPHATE SERPL-MCNC: 6.5 MG/DL (ref 2.5–4.9)
PLATELET # BLD AUTO: 66 K/UL (ref 135–450)
PMV BLD AUTO: 8.4 FL (ref 5–10.5)
POTASSIUM SERPL-SCNC: 3.9 MMOL/L (ref 3.5–5.1)
PROTHROMBIN TIME: 16.5 SEC (ref 11.5–14.8)
RBC # BLD AUTO: 4.22 M/UL (ref 4.2–5.9)
SODIUM SERPL-SCNC: 134 MMOL/L (ref 136–145)
VANCOMYCIN SERPL-MCNC: 14.2 UG/ML
WBC # BLD AUTO: 21.4 K/UL (ref 4–11)

## 2023-06-21 PROCEDURE — C9113 INJ PANTOPRAZOLE SODIUM, VIA: HCPCS | Performed by: INTERNAL MEDICINE

## 2023-06-21 PROCEDURE — 84100 ASSAY OF PHOSPHORUS: CPT

## 2023-06-21 PROCEDURE — 80069 RENAL FUNCTION PANEL: CPT

## 2023-06-21 PROCEDURE — 2580000003 HC RX 258: Performed by: INTERNAL MEDICINE

## 2023-06-21 PROCEDURE — 6360000002 HC RX W HCPCS: Performed by: INTERNAL MEDICINE

## 2023-06-21 PROCEDURE — APPNB60 APP NON BILLABLE TIME 46-60 MINS: Performed by: NURSE PRACTITIONER

## 2023-06-21 PROCEDURE — 99291 CRITICAL CARE FIRST HOUR: CPT | Performed by: INTERNAL MEDICINE

## 2023-06-21 PROCEDURE — 83735 ASSAY OF MAGNESIUM: CPT

## 2023-06-21 PROCEDURE — 36415 COLL VENOUS BLD VENIPUNCTURE: CPT

## 2023-06-21 PROCEDURE — 82140 ASSAY OF AMMONIA: CPT

## 2023-06-21 PROCEDURE — 85025 COMPLETE CBC W/AUTO DIFF WBC: CPT

## 2023-06-21 PROCEDURE — 99233 SBSQ HOSP IP/OBS HIGH 50: CPT | Performed by: INTERNAL MEDICINE

## 2023-06-21 PROCEDURE — 85610 PROTHROMBIN TIME: CPT

## 2023-06-21 PROCEDURE — 83605 ASSAY OF LACTIC ACID: CPT

## 2023-06-21 PROCEDURE — 86160 COMPLEMENT ANTIGEN: CPT

## 2023-06-21 PROCEDURE — 80202 ASSAY OF VANCOMYCIN: CPT

## 2023-06-21 PROCEDURE — 94761 N-INVAS EAR/PLS OXIMETRY MLT: CPT

## 2023-06-21 PROCEDURE — 2000000000 HC ICU R&B

## 2023-06-21 PROCEDURE — 2500000003 HC RX 250 WO HCPCS: Performed by: INTERNAL MEDICINE

## 2023-06-21 PROCEDURE — P9047 ALBUMIN (HUMAN), 25%, 50ML: HCPCS | Performed by: INTERNAL MEDICINE

## 2023-06-21 PROCEDURE — 82550 ASSAY OF CK (CPK): CPT

## 2023-06-21 PROCEDURE — 2700000000 HC OXYGEN THERAPY PER DAY

## 2023-06-21 RX ORDER — SODIUM CHLORIDE, SODIUM LACTATE, POTASSIUM CHLORIDE, CALCIUM CHLORIDE 600; 310; 30; 20 MG/100ML; MG/100ML; MG/100ML; MG/100ML
INJECTION, SOLUTION INTRAVENOUS CONTINUOUS
Status: DISCONTINUED | OUTPATIENT
Start: 2023-06-21 | End: 2023-06-21

## 2023-06-21 RX ORDER — SODIUM CHLORIDE 9 MG/ML
INJECTION, SOLUTION INTRAVENOUS CONTINUOUS
Status: DISCONTINUED | OUTPATIENT
Start: 2023-06-21 | End: 2023-06-21

## 2023-06-21 RX ORDER — SODIUM CHLORIDE, SODIUM GLUCONATE, SODIUM ACETATE, POTASSIUM CHLORIDE AND MAGNESIUM CHLORIDE 526; 502; 368; 37; 30 MG/100ML; MG/100ML; MG/100ML; MG/100ML; MG/100ML
100 INJECTION, SOLUTION INTRAVENOUS CONTINUOUS
Status: DISCONTINUED | OUTPATIENT
Start: 2023-06-21 | End: 2023-06-23

## 2023-06-21 RX ORDER — ALBUMIN (HUMAN) 12.5 G/50ML
25 SOLUTION INTRAVENOUS EVERY 8 HOURS
Status: COMPLETED | OUTPATIENT
Start: 2023-06-21 | End: 2023-06-23

## 2023-06-21 RX ADMIN — CEFTRIAXONE SODIUM 2000 MG: 2 INJECTION, POWDER, FOR SOLUTION INTRAMUSCULAR; INTRAVENOUS at 01:41

## 2023-06-21 RX ADMIN — CEFTRIAXONE SODIUM 2000 MG: 2 INJECTION, POWDER, FOR SOLUTION INTRAMUSCULAR; INTRAVENOUS at 14:05

## 2023-06-21 RX ADMIN — SODIUM CHLORIDE, SODIUM GLUCONATE, SODIUM ACETATE, POTASSIUM CHLORIDE AND MAGNESIUM CHLORIDE 100 ML/HR: 526; 502; 368; 37; 30 INJECTION, SOLUTION INTRAVENOUS at 12:21

## 2023-06-21 RX ADMIN — ALBUMIN (HUMAN) 25 G: 0.25 INJECTION, SOLUTION INTRAVENOUS at 13:09

## 2023-06-21 RX ADMIN — Medication 1.4 MCG/KG/HR: at 22:34

## 2023-06-21 RX ADMIN — VANCOMYCIN HYDROCHLORIDE 500 MG: 500 INJECTION, POWDER, LYOPHILIZED, FOR SOLUTION INTRAVENOUS at 02:38

## 2023-06-21 RX ADMIN — DEXAMETHASONE SODIUM PHOSPHATE 10 MG: 10 INJECTION INTRAMUSCULAR; INTRAVENOUS at 04:30

## 2023-06-21 RX ADMIN — Medication 1.4 MCG/KG/HR: at 17:50

## 2023-06-21 RX ADMIN — DEXAMETHASONE SODIUM PHOSPHATE 10 MG: 10 INJECTION INTRAMUSCULAR; INTRAVENOUS at 16:45

## 2023-06-21 RX ADMIN — ALBUMIN (HUMAN) 25 G: 0.25 INJECTION, SOLUTION INTRAVENOUS at 20:09

## 2023-06-21 RX ADMIN — DEXAMETHASONE SODIUM PHOSPHATE 10 MG: 10 INJECTION INTRAMUSCULAR; INTRAVENOUS at 12:23

## 2023-06-21 RX ADMIN — SODIUM CHLORIDE, POTASSIUM CHLORIDE, SODIUM LACTATE AND CALCIUM CHLORIDE: 600; 310; 30; 20 INJECTION, SOLUTION INTRAVENOUS at 00:17

## 2023-06-21 RX ADMIN — MUPIROCIN: 20 OINTMENT TOPICAL at 20:09

## 2023-06-21 RX ADMIN — DEXAMETHASONE SODIUM PHOSPHATE 10 MG: 10 INJECTION INTRAMUSCULAR; INTRAVENOUS at 20:50

## 2023-06-21 RX ADMIN — VANCOMYCIN HYDROCHLORIDE 500 MG: 500 INJECTION, POWDER, LYOPHILIZED, FOR SOLUTION INTRAVENOUS at 15:15

## 2023-06-21 RX ADMIN — PANTOPRAZOLE SODIUM 40 MG: 40 INJECTION, POWDER, FOR SOLUTION INTRAVENOUS at 12:22

## 2023-06-21 RX ADMIN — ONDANSETRON 4 MG: 2 INJECTION INTRAMUSCULAR; INTRAVENOUS at 07:52

## 2023-06-21 RX ADMIN — MUPIROCIN: 20 OINTMENT TOPICAL at 09:00

## 2023-06-21 RX ADMIN — Medication 1.1 MCG/KG/HR: at 07:39

## 2023-06-21 NOTE — ACP (ADVANCE CARE PLANNING)
Advance Care Planning     General Advance Care Planning (ACP) Conversation    Date of Conversation: 6/19/2023  Conducted with:  Healthcare Decision Maker: Next of Kin by law (only applies in absence of above) (name) Spouse    Healthcare Decision Maker:    Primary Decision Maker: Moraima - [phone]    Primary Decision Maker: Syd Cummins - [facility] - [phone]  Click here to complete Healthcare Decision Makers including selection of the Healthcare Decision Maker Relationship (ie \"Primary\"). Today we documented Decision Maker(s) consistent with Legal Next of Kin hierarchy.     Content/Action Overview:  Has NO ACP documents/care preferences - information provided, considering goals and options   referred to Spiritual care  Remains full code    Length of Voluntary ACP Conversation in minutes:  <16 minutes (Non-Billable)    Anselmo aPte RN

## 2023-06-21 NOTE — CARE COORDINATION
Case Management Assessment  Initial Evaluation    Date/Time of Evaluation: 6/21/2023 11:08 AM  Assessment Completed by: Jerilyn Hale RN    If patient is discharged prior to next notation, then this note serves as note for discharge by case management. Patient Name: Rock Mcgrath                   YOB: 1960  Diagnosis: Lactic acidosis [E87.20]  Acute renal failure with tubular necrosis ([address] [N17.0]  Thrombocytopenia ([address] [D69.6]  Atypical pneumonia [J18.9]  Acute encephalopathy [G93.40]  Septic shock ([address] [A41.9, R65.21]  Sepsis with acute organ dysfunction without septic shock, due to unspecified organism, unspecified type ([address] [A41.9, R65.20]                   Date / Time: 6/19/2023 10:17 PM    Patient Admission Status: Inpatient   Readmission Risk (Low < 19, Mod (19-27), High > 27): Readmission Risk Score: 15.2    Current PCP: Shilo Staley  PCP verified by CM? Yes (no PCP)    Chart Reviewed: Yes      History Provided by: Spouse  Patient Orientation: Sedated    Patient Cognition: Other (see comment) (sedated)    Hospitalization in the last 30 days (Readmission):  No    If yes, Readmission Assessment in CM Navigator will be completed. Advance Directives:      Code Status: Full Code   Patient's Primary Decision Maker is: Legal Next of Kin    Primary Decision MakerDillan Dears - Spouse - [phone]    Primary Decision Maker: Sushma Monsivais Child - [phone]    Discharge Planning:    Patient lives with: Spouse/Significant Other Type of Home: House  Primary Care Giver: Self  Patient Support Systems include: Spouse/Significant Other, Children   Current Financial resources: None  Current community resources: None  Current services prior to admission: None            Current DME:              Type of Home Care services:  None    ADLS  Prior functional level:  Independent in ADLs/IADLs  Current functional level: Assistance with the following:, Bathing, Dressing, Toileting,

## 2023-06-22 PROBLEM — E72.20 HYPERAMMONEMIA (HCC): Status: ACTIVE | Noted: 2023-06-22

## 2023-06-22 LAB
ACANTHOCYTES BLD QL SMEAR: ABNORMAL
AMMONIA PLAS-SCNC: 45 UMOL/L (ref 16–60)
ANION GAP SERPL CALCULATED.3IONS-SCNC: 17 MMOL/L (ref 3–16)
ANISOCYTOSIS BLD QL SMEAR: ABNORMAL
BACTERIA SPEC RESP CULT: NORMAL
BASOPHILS # BLD: 0 K/UL (ref 0–0.2)
BASOPHILS NFR BLD: 0 %
BUN SERPL-MCNC: 90 MG/DL (ref 7–20)
CALCIUM SERPL-MCNC: 9.4 MG/DL (ref 8.3–10.6)
CHLORIDE SERPL-SCNC: 107 MMOL/L (ref 99–110)
CO2 SERPL-SCNC: 19 MMOL/L (ref 21–32)
CREAT SERPL-MCNC: 2.1 MG/DL (ref 0.8–1.3)
DEPRECATED RDW RBC AUTO: 19 % (ref 12.4–15.4)
EOSINOPHIL # BLD: 0 K/UL (ref 0–0.6)
EOSINOPHIL NFR BLD: 0 %
FUNGUS SPEC CULT: ABNORMAL
GFR SERPLBLD CREATININE-BSD FMLA CKD-EPI: 35 ML/MIN/{1.73_M2}
GLUCOSE SERPL-MCNC: 138 MG/DL (ref 70–99)
GRAM STN SPEC: NORMAL
HCT VFR BLD AUTO: 27.1 % (ref 40.5–52.5)
HGB BLD-MCNC: 8.6 G/DL (ref 13.5–17.5)
HIV-1 QNT LOG, IU/ML: 5.45 LOG CPY/ML
HIV-1 QNT, IU/ML: ABNORMAL CPY/ML
INR PPP: 1.15 (ref 0.84–1.16)
INTERPRETATION: DETECTED
LOEFFLER MB STN SPEC: ABNORMAL
LYMPHOCYTES # BLD: 0.7 K/UL (ref 1–5.1)
LYMPHOCYTES NFR BLD: 3 %
Lab: NORMAL
MAGNESIUM SERPL-MCNC: 3.2 MG/DL (ref 1.8–2.4)
MCH RBC QN AUTO: 22.9 PG (ref 26–34)
MCHC RBC AUTO-ENTMCNC: 31.6 G/DL (ref 31–36)
MCV RBC AUTO: 72.5 FL (ref 80–100)
MICROCYTES BLD QL SMEAR: ABNORMAL
MONOCYTES # BLD: 0.2 K/UL (ref 0–1.3)
MONOCYTES NFR BLD: 1 %
NEUTROPHILS # BLD: 21.9 K/UL (ref 1.7–7.7)
NEUTROPHILS NFR BLD: 77 %
NEUTS BAND NFR BLD MANUAL: 19 % (ref 0–7)
ORGANISM: ABNORMAL
OVALOCYTES BLD QL SMEAR: ABNORMAL
PHOSPHATE SERPL-MCNC: 6.2 MG/DL (ref 2.5–4.9)
PLATELET # BLD AUTO: 44 K/UL (ref 135–450)
PLATELET BLD QL SMEAR: ABNORMAL
PMV BLD AUTO: 8.5 FL (ref 5–10.5)
POTASSIUM SERPL-SCNC: 4 MMOL/L (ref 3.5–5.1)
PROTHROMBIN TIME: 14.7 SEC (ref 11.5–14.8)
RBC # BLD AUTO: 3.73 M/UL (ref 4.2–5.9)
REASON FOR REJECTION: NORMAL
REJECTED TEST: NORMAL
REPORT: NORMAL
SLIDE REVIEW: ABNORMAL
SODIUM SERPL-SCNC: 143 MMOL/L (ref 136–145)
SPHEROCYTES BLD QL SMEAR: ABNORMAL
THIS TEST SENT TO: NORMAL
TOXIC GRANULES BLD QL SMEAR: PRESENT
VANCOMYCIN SERPL-MCNC: 10.1 UG/ML
WBC # BLD AUTO: 22.8 K/UL (ref 4–11)

## 2023-06-22 PROCEDURE — 2580000003 HC RX 258: Performed by: INTERNAL MEDICINE

## 2023-06-22 PROCEDURE — 6360000002 HC RX W HCPCS: Performed by: INTERNAL MEDICINE

## 2023-06-22 PROCEDURE — 83735 ASSAY OF MAGNESIUM: CPT

## 2023-06-22 PROCEDURE — 6360000002 HC RX W HCPCS: Performed by: NURSE PRACTITIONER

## 2023-06-22 PROCEDURE — 99233 SBSQ HOSP IP/OBS HIGH 50: CPT | Performed by: INTERNAL MEDICINE

## 2023-06-22 PROCEDURE — 2000000000 HC ICU R&B

## 2023-06-22 PROCEDURE — 87040 BLOOD CULTURE FOR BACTERIA: CPT

## 2023-06-22 PROCEDURE — 36415 COLL VENOUS BLD VENIPUNCTURE: CPT

## 2023-06-22 PROCEDURE — 82140 ASSAY OF AMMONIA: CPT

## 2023-06-22 PROCEDURE — 85610 PROTHROMBIN TIME: CPT

## 2023-06-22 PROCEDURE — 2500000003 HC RX 250 WO HCPCS: Performed by: INTERNAL MEDICINE

## 2023-06-22 PROCEDURE — 80048 BASIC METABOLIC PNL TOTAL CA: CPT

## 2023-06-22 PROCEDURE — 85025 COMPLETE CBC W/AUTO DIFF WBC: CPT

## 2023-06-22 PROCEDURE — 99291 CRITICAL CARE FIRST HOUR: CPT | Performed by: INTERNAL MEDICINE

## 2023-06-22 PROCEDURE — P9047 ALBUMIN (HUMAN), 25%, 50ML: HCPCS | Performed by: INTERNAL MEDICINE

## 2023-06-22 PROCEDURE — 84100 ASSAY OF PHOSPHORUS: CPT

## 2023-06-22 PROCEDURE — C9113 INJ PANTOPRAZOLE SODIUM, VIA: HCPCS | Performed by: INTERNAL MEDICINE

## 2023-06-22 PROCEDURE — 80202 ASSAY OF VANCOMYCIN: CPT

## 2023-06-22 RX ORDER — MORPHINE SULFATE 2 MG/ML
2 INJECTION, SOLUTION INTRAMUSCULAR; INTRAVENOUS
Status: DISCONTINUED | OUTPATIENT
Start: 2023-06-22 | End: 2023-06-23

## 2023-06-22 RX ORDER — MIDAZOLAM HYDROCHLORIDE 1 MG/ML
2 INJECTION INTRAMUSCULAR; INTRAVENOUS
Status: DISCONTINUED | OUTPATIENT
Start: 2023-06-22 | End: 2023-06-23

## 2023-06-22 RX ADMIN — ALBUMIN (HUMAN) 25 G: 0.25 INJECTION, SOLUTION INTRAVENOUS at 13:04

## 2023-06-22 RX ADMIN — SODIUM CHLORIDE, SODIUM GLUCONATE, SODIUM ACETATE, POTASSIUM CHLORIDE AND MAGNESIUM CHLORIDE 100 ML/HR: 526; 502; 368; 37; 30 INJECTION, SOLUTION INTRAVENOUS at 05:38

## 2023-06-22 RX ADMIN — DEXAMETHASONE SODIUM PHOSPHATE 10 MG: 10 INJECTION INTRAMUSCULAR; INTRAVENOUS at 03:20

## 2023-06-22 RX ADMIN — CEFTRIAXONE SODIUM 2000 MG: 2 INJECTION, POWDER, FOR SOLUTION INTRAMUSCULAR; INTRAVENOUS at 02:12

## 2023-06-22 RX ADMIN — DEXAMETHASONE SODIUM PHOSPHATE 10 MG: 10 INJECTION INTRAMUSCULAR; INTRAVENOUS at 08:49

## 2023-06-22 RX ADMIN — MORPHINE SULFATE 2 MG: 2 INJECTION, SOLUTION INTRAMUSCULAR; INTRAVENOUS at 20:07

## 2023-06-22 RX ADMIN — CEFTRIAXONE SODIUM 2000 MG: 2 INJECTION, POWDER, FOR SOLUTION INTRAMUSCULAR; INTRAVENOUS at 13:24

## 2023-06-22 RX ADMIN — MUPIROCIN: 20 OINTMENT TOPICAL at 08:32

## 2023-06-22 RX ADMIN — DEXAMETHASONE SODIUM PHOSPHATE 10 MG: 10 INJECTION INTRAMUSCULAR; INTRAVENOUS at 15:35

## 2023-06-22 RX ADMIN — PANTOPRAZOLE SODIUM 40 MG: 40 INJECTION, POWDER, FOR SOLUTION INTRAVENOUS at 08:49

## 2023-06-22 RX ADMIN — VANCOMYCIN HYDROCHLORIDE 1000 MG: 1 INJECTION, POWDER, LYOPHILIZED, FOR SOLUTION INTRAVENOUS at 08:07

## 2023-06-22 RX ADMIN — Medication 1 MCG/KG/HR: at 08:28

## 2023-06-22 RX ADMIN — MORPHINE SULFATE 2 MG: 2 INJECTION, SOLUTION INTRAMUSCULAR; INTRAVENOUS at 15:49

## 2023-06-22 RX ADMIN — MUPIROCIN: 20 OINTMENT TOPICAL at 20:48

## 2023-06-22 RX ADMIN — Medication 0.6 MCG/KG/HR: at 19:31

## 2023-06-22 RX ADMIN — ALBUMIN (HUMAN) 25 G: 0.25 INJECTION, SOLUTION INTRAVENOUS at 20:52

## 2023-06-22 RX ADMIN — Medication 1.4 MCG/KG/HR: at 13:35

## 2023-06-22 RX ADMIN — MORPHINE SULFATE 2 MG: 2 INJECTION, SOLUTION INTRAMUSCULAR; INTRAVENOUS at 17:30

## 2023-06-22 RX ADMIN — MIDAZOLAM 2 MG: 1 INJECTION INTRAMUSCULAR; INTRAVENOUS at 20:07

## 2023-06-22 RX ADMIN — DEXAMETHASONE SODIUM PHOSPHATE 10 MG: 10 INJECTION INTRAMUSCULAR; INTRAVENOUS at 20:48

## 2023-06-22 RX ADMIN — ALBUMIN (HUMAN) 25 G: 0.25 INJECTION, SOLUTION INTRAVENOUS at 04:22

## 2023-06-22 RX ADMIN — Medication 1.4 MCG/KG/HR: at 03:20

## 2023-06-22 ASSESSMENT — PAIN SCALES - GENERAL: PAINLEVEL_OUTOF10: 3

## 2023-06-22 NOTE — CARE COORDINATION
LOS 2. Care managed by Jerrell Rodgers, ID, Neph. Here w PNA, HIV. On 02 NC, and on Precedex gtt. [facility] consulting [facility] and Hospice- spoke to dtr at bedside- family has Hospice list- Bess Graham will see to F/U. Pt is from home w spouse. Dtr is supportive. Pt has no insurance at this time. [address] Aid has been consulted- unclear at this time if pt would be [facility] eligible. CM following. Tashia Osorio RN     1500 Plan for family to meet w [facility] for poss IPU. Pall Care RN has arranged.  Tashia Osorio RN

## 2023-06-22 NOTE — CONSULTS
ID CONSULT PLACED @ 2042 6/21/23
Infectious Diseases   Consult Note      Reason for Consult:  HIV, pneumococcal pneumonia with bacteremia, altered mental status      Requesting Physician:  Rosemarie Chapa MD         Date of Admission: 6/19/2023  Subjective:   CHIEF COMPLAINT:  unable to provide       HPI:    Amanda Be \"Kit\" Justin Harvey is a 61yoM with history of CAD, HLD, smoking     Long standing history of HIV diagnosed 2008. Out of care for at least 10 years. Per daughter and wife at bedside, both of whom are aware of diagnosis, he has chosen not to be treated for philosophical reasons. Has been away from all formal medical care for years. Began feeling ill on or around 6/15/23 with malaise and fever. Then noted cough and SOB. On 6/19/23, high grade fever was persistent and he was becoming increasingly altered and was brought to ED. On arrival he was hypertensive. WBC was wnl  Platelets were low at 61  FREDERICK noted   CXR with multifocal pneumonia. He was agitated, combative. Admitted for management of sepsis, FREEDRICK, encephalopathy, presumed LRTI as source of infection. Urine pneumococcal antigen is positive. BC collected on admission are positive with S pneumoniae. HIV VL and CD4 were ordered and are pending. He is in soft wrist restraints and on sedative infusion and dose not contribute to the history. Current abx: Bactrim 342mg q8  Vanc 500 q12  Cefepime 2g q12     Solumedrol        Past Surgical History:       Diagnosis Date    HIV (human immunodeficiency virus infection) ([facility] [address]     Hydrocephalus ([facility] [address]          Procedure Laterality Date    HERNIA REPAIR         Social History:    TOBACCO:   reports that he has been smoking. He does not have any smokeless tobacco history on file. ETOH:   reports current alcohol use. There is no history of illicit drug use or other significant epidemiologic exposures. Family History:   History reviewed. No pertinent family history.   There is no family history of autoimmune
Palliative Care Initial Note  Palliative Care Admit date:  6/22/23    ACP/palcare referral noted
Pharmacy Note  Vancomycin Consult    Amy Pedraza is a 61 y.o. male started on Vancomycin for Pneumonia (CAP) for an undetermined duration; consult received from Dr. Romeo Mandujano to manage therapy. Also receiving the following antibiotics: Bactrim and cefepime. Allergies:  Patient has no known allergies. Tmax: 98.2  Recent Labs     06/19/23  2245   CREATININE 1.6*     Recent Labs     06/19/23  2347   WBC 7.2     Estimated Creatinine Clearance: 44 mL/min (A) (based on SCr of 1.6 mg/dL (H)). No intake or output data in the 24 hours ending 06/20/23 0454  Wt Readings from Last 1 Encounters:   06/20/23 145 lb 4.5 oz (65.9 kg)       Body mass index is 22.09 kg/m². Date Culture Results   6/20 Blood x2 ordered, yet to be drawn   6/20 MRSA Nasal Probe ordered, yet to be drawn     Goal Vancomycin AUC: 400-600   AUC24,ss: 469 mg/L.hr  Probability of AUC24 > 400: 68 %  Ctrough,ss: 16.6 mg/L  Probability of Ctrough,ss > 20: 31 %  Probability of nephrotoxicity (Lodise LORRAINE 2009): 12 %    Assessment/Plan:  Patient received a one-time dose of Vancomycin 1500 mg in the ED, which will now be   followed by 500 mg IV every 12 hours. Calculated AUC: 469 mg/L.hr Predicted Trough: 16.6 mcg/L     Vancomycin level ordered for 6/21 at 0000. Timing of vancomycin levels will be determined based on culture results, renal function, and clinical response. Thank you for the consult,  Ravinder Hauser, PharmD 6/20/2023 4:54 AM    ----------------------------------------------------  6/21/2023 12:47 AM                                           Vancomycin Progress Note  Day: 2 Indication: CAP Other Antibiotics: ceftriaxone     Recent Labs     06/21/23  0010   VANCORANDOM 14.2     Recent Labs     06/19/23  2245 06/20/23  0633   CREATININE 1.6* 1.6*     Recent Labs     06/19/23  2347   WBC 7.2     Estimated Creatinine Clearance: 44 mL/min (A) (based on SCr of 1.6 mg/dL (H)).     Date Culture Results   6/20 Blood x2 Strep pneumo
Pharmacy Note  Vancomycin Consult    Nino Oliveros is a 61 y.o. male started on Vancomycin for Sepsis for a one-time dose in the ED; consult received from Dr. Aly Ellsworth to manage therapy. Also receiving the following antibiotics: cefepime + bactrim. Allergies:  Patient has no known allergies. Tmax: 97.7  Recent Labs     06/19/23  2245   CREATININE 1.6*     Recent Labs     06/19/23  2347   WBC 7.2     Estimated Creatinine Clearance: 46 mL/min (A) (based on SCr of 1.6 mg/dL (H)). No intake or output data in the 24 hours ending 06/20/23 0053  Wt Readings from Last 1 Encounters:   06/20/23 151 lb (68.5 kg)       Body mass index is 22.96 kg/m².     Assessment/Plan:  Will initiate Vancomycin with a one-time loading dose of 1500 mg x1    If you would like to continue vancomycin therapy if/when patient is admitted, please re-consult pharmacy    Thank you for the consult,  Liz Cruz, PharmD 6/20/2023 12:53 AM
RENAL CONSULT PLACED @ 6878 6/21/23
[facility] Consult Note           Reason for Consult:  Acute kidney injury  Requesting Physician:  Dr. Sophie David    Chief Complaint:  Fevers  History Obtained From:  patient, electronic medical record    History of Present Ilness:    61year old male with CAD, HIV, Hyperlipidemia with no recent medical care presents with fevers. We have been asked to assist in further mgmt of his FREDERICK. SCr 1.6 on admission, trending up to 2.0, non-oliguric with azotemia with BUN up to the 70's. UA large blood, >300 protein, s.g.>1.030, 21-40 coarse granular casts, 6-9 wbc's, 21-50 rbc's. Found to have Strep pneumoniae bacteremia with chest x-ray with multi-focal PNA. Initially given vancomycin, bactrim, & cefepime, currently on vancomycin & ceftriaxone. Vanc level of 14 today. Started having fevers, weakness on 6/15 which progressed to shortness of breath, cough. BP's have been on lower side, receiving IVF's with improvement. Past Medical History:        Diagnosis Date    HIV (human immunodeficiency virus infection) ([facility] [address]     Hydrocephalus ([facility] [address]        Past Surgical History:        Procedure Laterality Date    BRONCHOSCOPY N/A 6/20/2023    BRONCHOSCOPY THERAPUTIC ASPIRATION INITIAL performed by Latosha Lee MD at [facility]  6/20/2023    BRONCHOSCOPY ALVEOLAR LAVAGE performed by Latsoha Lee MD at [address] [place] [address] Medications:    No current facility-administered medications on file prior to encounter. Current Outpatient Medications on File Prior to Encounter   Medication Sig Dispense Refill    aspirin 81 MG chewable tablet Take 1 tablet by mouth daily for 30 days. 30 tablet 1    atorvastatin (LIPITOR) 20 MG tablet Take 1 tablet by mouth nightly for 30 days. 30 tablet 1    nitroGLYCERIN (NITROSTAT) 0.4 MG SL tablet Place 1 tablet under the tongue every 5 minutes as needed for Chest pain.  25 tablet 3    metoprolol
Problems:    * No resolved hospital problems.  *          Plan:   Oxygen supplementation to keep sat currently 90-94% only  Please titrate oxygen as per the above parameters  Patient was on 6 L of nasal oxygen when seen this morning to maintain oxygen saturation  Events overnight were reviewed  Patient has been started on cefepime along with vancomycin and Bactrim by the admitting provider  Patient urine strep is positive  ID consult has been requested-May consider de-escalation of antimicrobials  Patient does have untreated HIV along with that patient has pulm infiltrates  Patient does not have any absolute neutropenia on CBC  Patient does have a pulm infiltrates with high AA gradient  Patient may benefit from a bronchoscopy for diagnostic purposes if patient's family is agreeable  Patient will benefit from a low-dose IV steroids which has been started  Patient was given some Versed overnight but it had some paradoxical effect as per nursing  Patient has been started on Precedex infusion at the admitting provider-we will consider de-escalation and stoppage of that and reassess the neuro status  Patient is a CT of the head shows patient to have some chronic small vessel ischemic disease along with diffuse ventricular prominence which may be related to central volume loss or comfort of NPH  Patient may require MRI of the head-no discrete lesions to suggest any infective process in the brain or CT of the head  Stool for occult blood ordered  Trend the platelet counts closely  Monitor input output and BMP  Correct electrolytes on minimalistic basis  Trend the fever curve  Monitor hemodynamics and cardiac rhythm closely  IV PPI started    Case discussed with ICU team and family    Patient clinical status is precarious and critical and has potential for further decompensation and needs to monitor closely in the ICU    Critical care time spent with patient was 35 minutes exclusive of any

## 2023-06-22 NOTE — PLAN OF CARE
Problem: Discharge Planning  Goal: Discharge to home or other facility with appropriate resources  Outcome: Progressing  Flowsheets (Taken 6/21/2023 2000)  Discharge to home or other facility with appropriate resources:   Identify barriers to discharge with patient and caregiver   Arrange for needed discharge resources and transportation as appropriate   Identify discharge learning needs (meds, wound care, etc)   Refer to discharge planning if patient needs post-hospital services based on physician order or complex needs related to functional status, cognitive ability or social support system     Problem: Pain  Goal: Verbalizes/displays adequate comfort level or baseline comfort level  Outcome: Progressing     Problem: Safety - Medical Restraint  Goal: Remains free of injury from restraints (Restraint for Interference with Medical Device)  Description: INTERVENTIONS:  1. Determine that other, less restrictive measures have been tried or would not be effective before applying the restraint  2. Evaluate the patient's condition at the time of restraint application  3. Inform patient/family regarding the reason for restraint  4.  Q2H: Monitor safety, psychosocial status, comfort, nutrition and hydration  Outcome: Progressing  Flowsheets  Taken 6/21/2023 2200  Remains free of injury from restraints (restraint for interference with medical device):   Determine that other, less restrictive measures have been tried or would not be effective before applying the restraint   Evaluate the patient's condition at the time of restraint application   Every 2 hours: Monitor safety, psychosocial status, comfort, nutrition and hydration  Taken 6/21/2023 2000  Remains free of injury from restraints (restraint for interference with medical device):   Determine that other, less restrictive measures have been tried or would not be effective before applying the restraint   Evaluate the patient's condition at the time of restraint

## 2023-06-22 NOTE — ACP (ADVANCE CARE PLANNING)
Advance Care Planning     Advance Care Planning Activator (Inpatient)  Conversation Note      Date of ACP Conversation: 6/22/2023     Conversation Conducted with:  Healthcare Decision Maker: Next of Kin by law (only applies in absence of above) (name) Wm Jama, spouse    ACP Activator: Amy Biggs RN    Health Care Decision Maker:     Current Designated Health Care Decision Maker:     Primary Decision Maker: Pedro Carballo - Spouse - [phone]    Primary Decision Maker: Rl Frias - Child - [phone]  Click here to complete Healthcare Decision Makers including section of the Healthcare Decision Maker Relationship (ie \"Primary\")  Today we documented Decision Maker(s) consistent with Legal Next of Kin hierarchy. Palliative Care Initial Note  Palliative Care Admit date:  6/22/23    Advance Directives:  Pt has not executed AD's. In the absence or a HCPOA, pts spouse is viewed as the NOK. That said, she is working in close tandem w/ pts dtr and they are making decisions together    Pts family amended his code status yesterday to reflect DNR/DNI    Plan of care/goals: Aware that dtr had spoken w/ CM earlier, requesting information on hospice provider's. Met w/ spouse and dtr @ [facility] did not lend input to discussion, Ramesh Parikh provided all input. At this point, Ramesh Parikh verb'd understanding pts view around QOL and care he would/would not desire, depending on his medical circumstances. For this moment, dtr said they anticipate \"an MRI being ordered\" and they were antiipating seeing how \"today's goes. \"   Later informed by ID that family not so much wanting the MRI. Offered to address any questions re: hospice or current plan of care but they denied having any. Plan: Will continue f/u w/ family to support their decision making around plan of care.        Reason for consult:    _X_ Advance Care Planning  _X_ Transition of Care Planning: likely hospice   ___ Psychosocial/Spiritual

## 2023-06-22 NOTE — PLAN OF CARE
Palliative Care Progress Note  Palliative Care Admit date:  6/22/23    Advance Directives: 'Limited' code status to reflect DNR/DNI. Anticipate further amending to [facility]    Plan of care/goals:  Spouse has left hospital.  In d/w [facility], further addressed her questions re: \"timing\" and how best to move forward. She has reconciled the appropriateness of involving hospice in pts care and would like to meet w/ [facility]. Talked to her about her concern for \"when to call family to come\" and shared, if it is important to any significant family to see pt, then they should plan to come now. Family does not feel they can care for pt in the home; pt does not have insurance. Pt cannot take po abx (if that were even an option for him) and hospice will not continue IV abx upon admission to their service. Therefore, have asked [facility] to eval pt for IPU in light of his incomplete sepsis treatment, encephalopathy w/ restlessness, no plan for artificial nutrition; pt is likely to have ongoing sx needs. Social/Spiritual:  Spouse needed to take a break and decided to go home. However, [facility] feels spouse could return to sign hospice consents if/when necessary    Plan:  Ref called to [facility] to eval for [facility]. D/w EMMANUEL RN and apprised Abi NP    ADDENDUM @ [phone]:   Further d/w EMMANUEL RN who needs to meet w/ family tomorrow rather than today.   Have apprised [facility], who is in agreement, and also apprised Pulm NP      Reason for consult:    ___ Advance Care Planning  _X_Transition of Care Planning: hospice  _X_ Psychosocial/Spiritual Support  ___ Symptom Management                                                                                                                                                                                                                                Ariana Cota RN

## 2023-06-23 VITALS
BODY MASS INDEX: 22.29 KG/M2 | OXYGEN SATURATION: 90 % | SYSTOLIC BLOOD PRESSURE: 130 MMHG | WEIGHT: 147.05 LBS | RESPIRATION RATE: 20 BRPM | TEMPERATURE: 98.6 F | HEART RATE: 126 BPM | HEIGHT: 68 IN | DIASTOLIC BLOOD PRESSURE: 92 MMHG

## 2023-06-23 LAB
ACANTHOCYTES BLD QL SMEAR: ABNORMAL
ADENOVIRUS PCR: NOT DETECTED
ANION GAP SERPL CALCULATED.3IONS-SCNC: 13 MMOL/L (ref 3–16)
ANISOCYTOSIS BLD QL SMEAR: ABNORMAL
BACTERIA BLD CULT ORG #2: ABNORMAL
BACTERIA BLD CULT ORG #2: ABNORMAL
BACTERIA BLD CULT ORG #2: NORMAL
BACTERIA BLD CULT: ABNORMAL
BACTERIA BLD CULT: ABNORMAL
BACTERIA BLD CULT: NORMAL
BASOPHILS # BLD: 0 K/UL (ref 0–0.2)
BASOPHILS NFR BLD: 0 %
BUN SERPL-MCNC: 89 MG/DL (ref 7–20)
CALCIUM SERPL-MCNC: 9.3 MG/DL (ref 8.3–10.6)
CHLORIDE SERPL-SCNC: 113 MMOL/L (ref 99–110)
CO2 SERPL-SCNC: 24 MMOL/L (ref 21–32)
CREAT SERPL-MCNC: 1.8 MG/DL (ref 0.8–1.3)
DEPRECATED RDW RBC AUTO: 18.4 % (ref 12.4–15.4)
EOSINOPHIL # BLD: 0 K/UL (ref 0–0.6)
EOSINOPHIL NFR BLD: 0 %
GFR SERPLBLD CREATININE-BSD FMLA CKD-EPI: 42 ML/MIN/{1.73_M2}
GLUCOSE SERPL-MCNC: 173 MG/DL (ref 70–99)
HCT VFR BLD AUTO: 25.3 % (ref 40.5–52.5)
HGB BLD-MCNC: 8 G/DL (ref 13.5–17.5)
HUMAN METAPNEUMOVIRUS PCR: DETECTED
INFLUENZA A: NOT DETECTED
INFLUENZA B: NOT DETECTED
INR PPP: 1.16 (ref 0.84–1.16)
LYMPHOCYTES # BLD: 0.8 K/UL (ref 1–5.1)
LYMPHOCYTES NFR BLD: 4 %
MAGNESIUM SERPL-MCNC: 3.4 MG/DL (ref 1.8–2.4)
MCH RBC QN AUTO: 22.9 PG (ref 26–34)
MCHC RBC AUTO-ENTMCNC: 31.4 G/DL (ref 31–36)
MCV RBC AUTO: 72.8 FL (ref 80–100)
METAMYELOCYTES NFR BLD MANUAL: 1 %
MICROCYTES BLD QL SMEAR: ABNORMAL
MONOCYTES # BLD: 0 K/UL (ref 0–1.3)
MONOCYTES NFR BLD: 0 %
NEUTROPHILS # BLD: 19.9 K/UL (ref 1.7–7.7)
NEUTROPHILS NFR BLD: 91 %
NEUTS BAND NFR BLD MANUAL: 4 % (ref 0–7)
ORGANISM: ABNORMAL
OVALOCYTES BLD QL SMEAR: ABNORMAL
PARAINFLUENZA 1 PCR: NOT DETECTED
PARAINFLUENZA 2 PCR: NOT DETECTED
PARAINFLUENZA 3 PCR: NOT DETECTED
PARAINFLUENZA 4 PCR: NOT DETECTED
PHOSPHATE SERPL-MCNC: 5.1 MG/DL (ref 2.5–4.9)
PLATELET # BLD AUTO: 68 K/UL (ref 135–450)
PLATELET BLD QL SMEAR: ABNORMAL
PMV BLD AUTO: 8.8 FL (ref 5–10.5)
POIKILOCYTOSIS BLD QL SMEAR: ABNORMAL
POTASSIUM SERPL-SCNC: 4.2 MMOL/L (ref 3.5–5.1)
PROTHROMBIN TIME: 14.8 SEC (ref 11.5–14.8)
RBC # BLD AUTO: 3.48 M/UL (ref 4.2–5.9)
RHINO/ENTEROVIRUS PCR: DETECTED
RSV BY PCR: NOT DETECTED
RSV SOURCE: ABNORMAL
SLIDE REVIEW: ABNORMAL
SODIUM SERPL-SCNC: 150 MMOL/L (ref 136–145)
SPHEROCYTES BLD QL SMEAR: ABNORMAL
TOXIC GRANULES BLD QL SMEAR: PRESENT
WBC # BLD AUTO: 20.7 K/UL (ref 4–11)

## 2023-06-23 PROCEDURE — 6360000002 HC RX W HCPCS: Performed by: INTERNAL MEDICINE

## 2023-06-23 PROCEDURE — 85025 COMPLETE CBC W/AUTO DIFF WBC: CPT

## 2023-06-23 PROCEDURE — 2580000003 HC RX 258: Performed by: INTERNAL MEDICINE

## 2023-06-23 PROCEDURE — 6360000002 HC RX W HCPCS: Performed by: NURSE PRACTITIONER

## 2023-06-23 PROCEDURE — 84100 ASSAY OF PHOSPHORUS: CPT

## 2023-06-23 PROCEDURE — 36415 COLL VENOUS BLD VENIPUNCTURE: CPT

## 2023-06-23 PROCEDURE — 6360000002 HC RX W HCPCS: Performed by: HOSPITALIST

## 2023-06-23 PROCEDURE — 6370000000 HC RX 637 (ALT 250 FOR IP): Performed by: NURSE PRACTITIONER

## 2023-06-23 PROCEDURE — 94761 N-INVAS EAR/PLS OXIMETRY MLT: CPT

## 2023-06-23 PROCEDURE — 80048 BASIC METABOLIC PNL TOTAL CA: CPT

## 2023-06-23 PROCEDURE — 99233 SBSQ HOSP IP/OBS HIGH 50: CPT | Performed by: INTERNAL MEDICINE

## 2023-06-23 PROCEDURE — C9113 INJ PANTOPRAZOLE SODIUM, VIA: HCPCS | Performed by: INTERNAL MEDICINE

## 2023-06-23 PROCEDURE — 2700000000 HC OXYGEN THERAPY PER DAY

## 2023-06-23 PROCEDURE — P9047 ALBUMIN (HUMAN), 25%, 50ML: HCPCS | Performed by: INTERNAL MEDICINE

## 2023-06-23 PROCEDURE — 2000000000 HC ICU R&B

## 2023-06-23 PROCEDURE — 2500000003 HC RX 250 WO HCPCS: Performed by: INTERNAL MEDICINE

## 2023-06-23 PROCEDURE — 83735 ASSAY OF MAGNESIUM: CPT

## 2023-06-23 PROCEDURE — 85610 PROTHROMBIN TIME: CPT

## 2023-06-23 RX ORDER — MIDAZOLAM HYDROCHLORIDE 1 MG/ML
2 INJECTION INTRAMUSCULAR; INTRAVENOUS
Status: DISCONTINUED | OUTPATIENT
Start: 2023-06-23 | End: 2023-06-23

## 2023-06-23 RX ORDER — MORPHINE SULFATE 2 MG/ML
2 INJECTION, SOLUTION INTRAMUSCULAR; INTRAVENOUS
Status: DISCONTINUED | OUTPATIENT
Start: 2023-06-23 | End: 2023-06-24 | Stop reason: HOSPADM

## 2023-06-23 RX ORDER — MIDAZOLAM HYDROCHLORIDE 1 MG/ML
2 INJECTION INTRAMUSCULAR; INTRAVENOUS
Status: DISCONTINUED | OUTPATIENT
Start: 2023-06-23 | End: 2023-06-24 | Stop reason: HOSPADM

## 2023-06-23 RX ORDER — LORAZEPAM 2 MG/ML
2 INJECTION INTRAMUSCULAR ONCE
Status: DISCONTINUED | OUTPATIENT
Start: 2023-06-23 | End: 2023-06-23

## 2023-06-23 RX ORDER — OXYCODONE HCL 5 MG/5 ML
10 SOLUTION, ORAL ORAL
Status: DISCONTINUED | OUTPATIENT
Start: 2023-06-23 | End: 2023-06-23

## 2023-06-23 RX ORDER — LORAZEPAM 2 MG/ML
2 CONCENTRATE ORAL
Status: DISCONTINUED | OUTPATIENT
Start: 2023-06-23 | End: 2023-06-24 | Stop reason: HOSPADM

## 2023-06-23 RX ORDER — MORPHINE SULFATE 20 MG/ML
5 SOLUTION ORAL
Status: DISCONTINUED | OUTPATIENT
Start: 2023-06-23 | End: 2023-06-24 | Stop reason: HOSPADM

## 2023-06-23 RX ADMIN — CEFTRIAXONE SODIUM 2000 MG: 2 INJECTION, POWDER, FOR SOLUTION INTRAMUSCULAR; INTRAVENOUS at 01:12

## 2023-06-23 RX ADMIN — MORPHINE SULFATE 2 MG: 2 INJECTION, SOLUTION INTRAMUSCULAR; INTRAVENOUS at 11:58

## 2023-06-23 RX ADMIN — Medication 2 MG: at 19:06

## 2023-06-23 RX ADMIN — Medication 2 MG: at 15:09

## 2023-06-23 RX ADMIN — MORPHINE SULFATE 2 MG: 2 INJECTION, SOLUTION INTRAMUSCULAR; INTRAVENOUS at 09:00

## 2023-06-23 RX ADMIN — DEXAMETHASONE SODIUM PHOSPHATE 10 MG: 10 INJECTION INTRAMUSCULAR; INTRAVENOUS at 09:24

## 2023-06-23 RX ADMIN — DEXAMETHASONE SODIUM PHOSPHATE 10 MG: 10 INJECTION INTRAMUSCULAR; INTRAVENOUS at 04:20

## 2023-06-23 RX ADMIN — MIDAZOLAM 2 MG: 1 INJECTION INTRAMUSCULAR; INTRAVENOUS at 01:09

## 2023-06-23 RX ADMIN — MORPHINE SULFATE 2 MG: 2 INJECTION, SOLUTION INTRAMUSCULAR; INTRAVENOUS at 04:21

## 2023-06-23 RX ADMIN — MIDAZOLAM 2 MG: 1 INJECTION INTRAMUSCULAR; INTRAVENOUS at 13:40

## 2023-06-23 RX ADMIN — ALBUMIN (HUMAN) 25 G: 0.25 INJECTION, SOLUTION INTRAVENOUS at 04:26

## 2023-06-23 RX ADMIN — Medication 5 MG: at 19:05

## 2023-06-23 RX ADMIN — MORPHINE SULFATE 2 MG: 2 INJECTION, SOLUTION INTRAMUSCULAR; INTRAVENOUS at 15:18

## 2023-06-23 RX ADMIN — MIDAZOLAM 2 MG: 1 INJECTION INTRAMUSCULAR; INTRAVENOUS at 04:21

## 2023-06-23 RX ADMIN — MUPIROCIN: 20 OINTMENT TOPICAL at 09:24

## 2023-06-23 RX ADMIN — MORPHINE SULFATE 2 MG: 2 INJECTION, SOLUTION INTRAMUSCULAR; INTRAVENOUS at 01:09

## 2023-06-23 RX ADMIN — Medication 5 MG: at 17:13

## 2023-06-23 RX ADMIN — PANTOPRAZOLE SODIUM 40 MG: 40 INJECTION, POWDER, FOR SOLUTION INTRAVENOUS at 09:24

## 2023-06-23 RX ADMIN — Medication 5 MG: at 15:41

## 2023-06-23 RX ADMIN — MIDAZOLAM 2 MG: 1 INJECTION INTRAMUSCULAR; INTRAVENOUS at 11:55

## 2023-06-23 RX ADMIN — Medication 5 MG: at 20:33

## 2023-06-23 RX ADMIN — MORPHINE SULFATE 2 MG: 2 INJECTION, SOLUTION INTRAMUSCULAR; INTRAVENOUS at 12:49

## 2023-06-23 RX ADMIN — MORPHINE SULFATE 2 MG: 2 INJECTION, SOLUTION INTRAMUSCULAR; INTRAVENOUS at 07:20

## 2023-06-23 RX ADMIN — MIDAZOLAM 2 MG: 1 INJECTION INTRAMUSCULAR; INTRAVENOUS at 09:24

## 2023-06-23 RX ADMIN — MIDAZOLAM 2 MG: 1 INJECTION INTRAMUSCULAR; INTRAVENOUS at 07:20

## 2023-06-23 RX ADMIN — MORPHINE SULFATE 2 MG: 2 INJECTION, SOLUTION INTRAMUSCULAR; INTRAVENOUS at 14:22

## 2023-06-23 RX ADMIN — Medication 0.6 MCG/KG/HR: at 04:49

## 2023-06-23 RX ADMIN — MORPHINE SULFATE 2 MG: 2 INJECTION, SOLUTION INTRAMUSCULAR; INTRAVENOUS at 13:40

## 2023-06-23 ASSESSMENT — PAIN SCALES - GENERAL: PAINLEVEL_OUTOF10: 3

## 2023-06-23 NOTE — CARE COORDINATION
Chart reviewed, Tita/NEREIDA's note indicate from conversation with pt's family, pt eligible for Medicaid since savings run out and he has had no income, the barrier was that pt had previously declined to apply for Medicaid or other financial aid. Writer spoke with [address] liaison, she is actively working with [facility] management and risk to get pt approved to be moved. CM available to assist as needed.   Romana Salisbury, AMINA-RN

## 2023-06-23 NOTE — PROGRESS NOTES
06/21/23 0959   Encounter Summary   Encounter Overview/Reason  Initial Encounter   Service Provided For: Patient and family together   Referral/Consult From: Rounding   Support System Spouse; Family members; Children   Last Encounter  06/21/23  (visited with wife and dtr present at bedside.  Nurtured hope.)   Complexity of Encounter Low   Begin Time 0900   End Time  0910   Total Time Calculated 10 min   Encounter    Type Initial Screen/Assessment
06/21/23 1318   Encounter Summary   Encounter Overview/Reason  Advance Care Planning   Service Provided For: Family   Referral/Consult From: Nurse   Last Encounter  06/21/23  (CM informed this [address]  that [facility] requested AD forms.  Pt is not able to have ACP conversations at this time)   Complexity of Encounter Moderate   Begin Time 1045   End Time  1055   Total Time Calculated 10 min
06/23/23 1123   Encounter Summary   Encounter Overview/Reason  Spiritual/Emotional Needs;Grief, Loss, and Adjustments   Service Provided For: Family   Referral/Consult From: Nurse   Support System Family members;Palliative Care;Hospice   Last Encounter    (6/23 follow up, sppt and prayer w/ family)   Complexity of Encounter Moderate   Begin Time 1100   End Time  1123   Total Time Calculated 23 min
8744 Spoke to pt's wife with updates of activities over night    0727  Shift change, bedside report given to  [facility]. Pt exhibits no s/s of distress. Pt remains in restraints at this time on Precedex infusion Care has been transferred at this time.
[facility]    Daughter is agreeable to [facility] services at [facility]. Patient has been legally  from wife Amrita Archer with no communication for the past 11 years, Amrita Archer declines involvement in care decisions. Plan is transport to [facility] after 1930 today. Transport scheduled per Kristan [facility] Osmany for 2000 with [facility]. Request increase comfort medications for dyspnea and agitation. Daughter is most concerned that patient not suffer. Any questions please call [phone]. Thank you.     Tigist JORDANN, RN, [facility]    Met with daughter Christy Ramirez, and her mother Wong Higginbotham (patient's ex-wife) to discuss HOC philosophy, levels of care and services, including support team. Per Christy Ramirez, patient has been estranged from his wife Nicki Caro for 12 years, but never . Nicki Caro is agreeable to whatever decision daughter makes, she would be available to sign hospice consent. Time spent listening to events of illness, answering questions, and providing emotional support. Per daughter, patient had quit his job at "[employer] in 2010, has been living off of savings and investments, has recently exhausted all of his savings. Bo and ex-wife will apply for financial assistance for hospice care. Given application. In addition, other family members are arriving from out of town, would like opportunity to visit patient. Given [address] folder and contact info. [address] RN to F/U tomorrow and as requested. Thank you for the opportunity to be involved with the care of this patient.  Any needs call [address] JORDANN, RN, [facility]
Hospitalist Progress Note  Michael Dutton MD      Name:  Santiago Ramirez /Age/Sex: 1960  (61 y.o. male)   MRN & CSN:  [id number] & [id number] Admission Date/Time: 2023 10:17 PM   Location:  0236/0236-01 PCP: [address] Day: 4    Assessment and Plan:   Santiago Ramirez is a 61 y.o.  male  who presents with severe sepsis    1. Severe sepsis POA based on documented pneumonia, tachypnea, tachycardia, FREDERICK, lactic acidosis, elevated procalcitonin. 2.  Strep bacteremia and pneumonia -appreciate ID recommendations. 2.  Acute metabolic encephalopathy -    3. HIV/AIDS -    4. FREDERICK -    5. Thrombocytopenia -     The patient wife and daughter. Discussed with pulmonary critical care. Plan noted for possible hospice/palliative. We will defer treatment to critical care at this time. The patient will be assigned to a new physician tomorrow. Body mass index is 22.36 kg/m². Diet Diet NPO Exceptions are: Sips of Water with Meds, Ice Chips   DVT Prophylaxis SCDs   Code Status Limited. Disposition To home versus ECF in 3-4 days     Subjective:     Overnight events reviewed. The patient was evaluated by hospice    Ten point ROS reviewed negative, unless as noted above    Objective:        Intake/Output Summary (Last 24 hours) at 2023 1245  Last data filed at 2023 0900  Gross per 24 hour   Intake 1284.41 ml   Output 865 ml   Net 419.41 ml        Vitals: /73   Pulse 70   Temp 97.4 °F (36.3 °C) (Axillary)   Resp (!) 35   Ht 5' 8\" (1.727 m)   Wt 147 lb 0.8 oz (66.7 kg)   SpO2 92%   BMI 22.36 kg/m²     Physical Exam:     Examination not done    Recent Results (from the past 24 hour(s))   Lactic Acid    Collection Time: 23  1:53 PM   Result Value Ref Range    Lactic Acid 2.2 (H) 0.4 - 2.0 mmol/L   Protime-INR    Collection Time: 23  4:48 AM   Result Value Ref Range    Protime 14.7 11.5 - 14.8 sec    INR 1.15 0.84 - 1.16   Ammonia    Collection
INPATIENT PULMONARY CRITICAL CARE PROGRESS NOTE      Reason for visit    sepsis due to pneumonia and patient with HIV/AIDS, or patient is pneumonia suspected    SUBJECTIVE: Patient's clinical status remains precarious, patient remains encephalopathic, patient needs to be on Precedex infusion to decrease agitation as per nursing, patient still is on 0.6 mg/h of Precedex infusion, patient is afebrile and as hemodynamically maintained, , patient has sinus rhythm on the monitor patient was seen this morning was on 3 L of nasal cannula oxygen saturating 97%, patient's urine output is liquid with cumulative fluid balance of +3.8 L, no other pertinent review of system could be obtained because of patient clinical status which remains precarious and critical         Physical Exam:  Blood pressure 128/71, pulse 73, temperature 98.7 °F (37.1 °C), temperature source Bladder, resp. rate 22, height 5' 8\" (1.727 m), weight 147 lb 0.8 oz (66.7 kg), SpO2 96 %.'       Constitutional:  No acute distress. Mild tachypnea  HENT:  Oropharynx is clear and moist. No thyromegaly. Eyes:  Conjunctivae are normal. Pupils equal, round, and reactive to light. No scleral icterus. Neck: . No tracheal deviation present. No obvious thyroid mass. Cardiovascular: Normal rate, regular rhythm, normal heart sounds. No right ventricular heave. No lower extremity edema. Pulmonary/Chest: No wheezes. No significant rales. Chest wall is not dull to percussion. No accessory muscle usage or stridor. Abdominal: Soft. Bowel sounds present. No distension or hernia. No tenderness. Musculoskeletal: No cyanosis. No clubbing. No obvious joint deformity. Lymphadenopathy: No cervical or supraclavicular adenopathy. Skin: Skin is warm and dry. No rash or nodules on the exposed extremities.   Neurologic: Encephalopathic    Results:  CBC:   Recent Labs     06/21/23  0613 06/22/23  0624 06/23/23  0432   WBC 21.4* 22.8* 20.7*   HGB 9.7* 8.6* 8.0*   HCT
INPATIENT PULMONARY CRITICAL CARE PROGRESS NOTE      Reason for visit    sepsis due to pneumonia and patient with HIV/AIDS, or patient is pneumonia suspected    SUBJECTIVE: Patient's clinical status remains precarious, patient remains encephalopathic, patient needs to be on precedex infusion to decrease agitation as per nursing, patient still is on 1 mg/h of Precedex infusion, patient is afebrile and as hemodynamically maintained, patient has intermittent tachypnea, patient has sinus rhythm on the monitor ranging from normal sinus rhythm to sinus bradycardia, patient's oxygen requirements are trending in the right direction, patient's urine output is still on the lower side with cumulative fluid balance of +3.8 L, no other pertinent review of system could be obtained because of patient clinical status which remains precarious and critical         Physical Exam:  Blood pressure 130/73, pulse 70, temperature 97.4 °F (36.3 °C), temperature source Axillary, resp. rate (!) 35, height 5' 8\" (1.727 m), weight 147 lb 0.8 oz (66.7 kg), SpO2 92 %.'       Constitutional:  No acute distress. Mild tachypnea  HENT:  Oropharynx is clear and moist. No thyromegaly. Eyes:  Conjunctivae are normal. Pupils equal, round, and reactive to light. No scleral icterus. Neck: . No tracheal deviation present. No obvious thyroid mass. Cardiovascular: Normal rate, regular rhythm, normal heart sounds. No right ventricular heave. No lower extremity edema. Pulmonary/Chest: No wheezes. Mild lateral rales. Chest wall is not dull to percussion. No accessory muscle usage or stridor. Abdominal: Soft. Bowel sounds present. No distension or hernia. No tenderness. Musculoskeletal: No cyanosis. No clubbing. No obvious joint deformity. Lymphadenopathy: No cervical or supraclavicular adenopathy. Skin: Skin is warm and dry. No rash or nodules on the exposed extremities.   Neurologic:sleeping/encephalopathic when seen on
Now plans for hospice  No further recommendations at this time  Will sign off for now  Call with questions
Patient seen and examined this am during ICU rounds  Lethargic this am while on precedex, wrist restraints for safety. Continued IV abx for pneumonia, bacteremia and CNS coverage per ID  Nephrology consulted for FREDERICK, IVF adjusted  NPO, potential NG discussed for nutrition if unable to take PO safely but family would like to wait. NH 65 this am, lactulose enema ordered per primary team, family also would like to defer for now. Extensive conversation with wife and daughter today regarding clinical status and goals of care. Precedex gtt weaned off today and patient continues to be encephalopathic, precedex to be restarted after discussing with family. Ongoing conversations regarding goals of care given multiple complexities. Family inquiring about hospice, will re-evaluate patient status tomorrow. Agreed to change code status to LIMITED x4 at this time. Continue current supportive care, IVF, antibiotics, restraints for safety and precedex infusion for agitation. Discussed with RN, updated Dr. Doris Patel.
Shift: 0904-2987    Admitting diagnosis: Sepsis with acute organ dysfunction without septic shock    Presentation to hospital: (+)fever/vomiting, labored breathing, altered mental status    Surgery: no     Nursing assessment at handoff  stable    Emergency Contact/POA: Violeta-wife  Family updated: no    Most recent vitals: /69   Pulse 59   Temp 97.3 °F (36.3 °C) (Axillary)   Resp 27   Ht 5' 8\" (1.727 m)   Wt 147 lb 0.8 oz (66.7 kg)   SpO2 94%   BMI 22.36 kg/m²      Rhythm: Normal Sinus Rhythm/ Sinus Bradycardia 50's     NC/HFNC- 0.5 lpm  Respiratory support: - No ventilator support    Vent days: Day     Increased O2 requirements: no    Admission weight Weight - Scale: 151 lb (68.5 kg)  Today's weight   Wt Readings from Last 1 Encounters:   06/22/23 147 lb 0.8 oz (66.7 kg)         UOP >30ml/hr: yes - 590ml for 12hours     Wong need assessed each shift: yes,     Restraints: yes,   Order current and documentation up to date?  Yes    Lines/Drains  LDA Insertion Date Discontinued Date Dressing Changes   PIV  6/20 6/21     TLC       Arterial       Wong   6/20     Vas Cath      ETT       Surgical drains        Night Shift Hospitalist Interventions    Problem(Brief) Date Time Intervention Physician contacted                                               Drip rates at handoff:    electrolyte-A 100 mL/hr (06/22/23 0538)    dexmedetomidine HCl in NaCl 1.1 mcg/kg/hr (06/22/23 0600)       Hospital Course Daily Updates:  Admit Day# 2 6/21/23 Night  -able to decreased 02 to 0.5lpm  -able to titrate down Precedex to 1.1  -still with episodes of agitation, now able to open eyes    Lab Data:   CBC:   Recent Labs     06/19/23  2347 06/20/23  0635 06/21/23  0613   WBC 7.2  --  21.4*   HGB 11.4*  --  9.7*   HCT 35.3* 30.6* 30.4*   MCV 72.0*  --  72.0*   PLT 61*  --  66*     BMP:    Recent Labs     06/21/23  1004 06/22/23  0449   * 143   K 3.9 4.0   CO2 18* 19*   BUN 77* 90*   CREATININE 2.0* 2.1*     LIVR:   Recent
[facility] Progress Note           Subjective/   61y.o. year old male who we are seeing in consultation for FREDERICK. HPI:  Renal function poor, slow to improve despite IVF's, albumin, non-oliguric. Mentation decreased. ROS:  +weak, intake reduced. Objective/   GEN:  Chronically ill, /73   Pulse 70   Temp 97.4 °F (36.3 °C) (Axillary)   Resp (!) 35   Ht 5' 8\" (1.727 m)   Wt 147 lb 0.8 oz (66.7 kg)   SpO2 92%   BMI 22.36 kg/m²   HEENT: non-icteric, no JVD  CV: S1, S2 without m/r/g; no LE edema  RESP: CTA B without w/r/r; breathing wnl  ABD: +bs, soft, nt, no hsm  SKIN: warm, no rashes    Data/  Recent Labs     06/19/23  2347 06/20/23  0635 06/21/23  0613 06/22/23  0624   WBC 7.2  --  21.4* 22.8*   HGB 11.4*  --  9.7* 8.6*   HCT 35.3* 30.6* 30.4* 27.1*   MCV 72.0*  --  72.0* 72.5*   PLT 61*  --  66* 44*     Recent Labs     06/20/23  0633 06/20/23  0635 06/21/23  0613 06/21/23  1004 06/22/23  0449     --   --  134* 143   K 4.0  --   --  3.9 4.0   CL 98*  --   --  103 107   CO2 23  --   --  18* 19*   GLUCOSE 94  --   --  119* 138*   PHOS 3.9 3.7 6.0* 6.5* 6.2*   MG  --  2.40 2.80*  --  3.20*   BUN 53*  --   --  77* 90*   CREATININE 1.6*  --   --  2.0* 2.1*   LABGLOM 48*  --   --  37* 35*       C3 101  C4 11    Assessment/     - Acute kidney injury - suspect ATN injury with hypotension & coarse granular casts seen on urine microscopy     - Strep PNA with bacteremia - plans per ID noted     - Anion-gap metabolic acidosis     - Hyponatremia       Plan/     - Continue plasmalyte 100 ml/hour & started course of IV albumin  - Trend labs, bp's, & urine output for hopeful improvement    Family states that if his condition continues to worsen that they would likely not pursue any aggressive measures     ____________________________________  Jose Abarca MD  [facility]  [email or web address]  Office: [phone]
V2.0       Hospitalist Progress Note Addendum    The patient was admitted after midnight for   Principal Problem:    Sepsis with acute organ dysfunction without septic shock, due to unspecified organism, unspecified type ([facility]  Active Problems:    AIDS (acquired immune deficiency syndrome) ([facility]    Acute respiratory failure with hypoxia (HCC)    Thrombocytopenia ([address]    FREDERICK (acute kidney injury) ([facility] [address]  Resolved Problems:    * No resolved hospital problems. *  . Hospital course:  61 y.o. male. He presented to the ER from home by private vehicle. His wife or significant other transported him to the hospital but is no longer present. His daughter is at bedside. Per her report the patient been febrile and increasingly ill since this past Thursday. She called to talk to him several times. Each time she called he seemed increasingly drowsy and confused. At baseline daughter relates the patient is highly functional and independent. She believes he has an advance directive and living will, but unsure who the patient has designated as an [address] [place] [address] Square. She is uncertain about whether patient is actually formally  right now. He was previously , then  for a long time, and then just recently moved back in. Patient has a longstanding h/o HIV diagnosed prior to 2008. He has not been taking HAART for at least the past ten years.     Brief update:  pt seems fairly sedated on precedex - will try to wean - spoke with daughter at length - he has an issue with taking government money, so was not interested in any type of wili care for his HIV< but he could not afford meds so he has not taken anything in at least 10 years    Strep antigen positive and now blood cx pos for strep pneumoniae    BP (!) 171/97   Pulse (!) 108   Temp 98.2 °F (36.8 °C) (Axillary)   Resp (!) 41   Ht 5' 8\" (1.727 m)   Wt 145 lb 4.5 oz (65.9 kg)   SpO2 95%   BMI 22.09 kg/m²     CV:  tachy  Pulm:  some
07/22/2013    Symptomatic HIV infection (HCC)        Long standing HIV infection, out of care for ~10 years with repeated statements indicating no intent to treat. Prior ARV exposure includes Atripla, full history not available. Last CD4 was 1100 in 2013   Updated CD4 31, viral load pending   -could be offered treatment this admission but in the past has declined therapy per family and hence would not initiate / no benefit of treatment without intent to continue   -would be candidate for OI ppx but this can be deferred for a few days while addressing sepsis and complications and GOC  -in the absence of plan to treat HIV, Hospice would be appropriate based on AIDS alone (though I do think his sepsis is still potentially treatable/survivable in the absence of HIV therapy and made that distinction with the family)     Admitted 6/19/23 with pneumococcal sepsis, multifocal CAP and bacteremia. +Urine antigen and blood cultures   Cannot exclude meningeal component of infection  -continue ceftriaxone as ordered  -vanc to be continued pending sensitivities. Pharmacy is dosing   -continue decadron x4d   -repeat BC in AM      Acute encephalopathy  Likely multifactorial   -would consider MRI if feasible      FREDERICK, worsening.   Non-oliguric      Thrombocytopenia      Leukocytosis - suspect this is steroid mediated     NKDA         Discussed with wife, dtr, all questions answered  D/w RN, Dr. Azul Cotton MD  Phone: [phone]   Fax : [phone]
Lymphocytes, BAL 1 (L) 5 - 10 %    Macrophages, BAL 7 (L) 90 - 95 %    Number of Cells Counted BAL (Lavage) 100     Path Review, BAL (Lavage) Yes    POCT Glucose    Collection Time: 06/20/23  1:36 PM   Result Value Ref Range    POC Glucose 152 (H) 70 - 99 mg/dl    Performed on ACCU-CHEK    Lactic Acid    Collection Time: 06/20/23  1:52 PM   Result Value Ref Range    Lactic Acid 3.3 (H) 0.4 - 2.0 mmol/L   Ammonia    Collection Time: 06/20/23  1:52 PM   Result Value Ref Range    Ammonia 87 (H) 16 - 60 umol/L   Vancomycin Level, Random    Collection Time: 06/21/23 12:10 AM   Result Value Ref Range    Vancomycin Rm 14.2 ug/mL   Protime-INR    Collection Time: 06/21/23  6:13 AM   Result Value Ref Range    Protime 16.5 (H) 11.5 - 14.8 sec    INR 1.33 (H) 0.84 - 1.16   Phosphorus    Collection Time: 06/21/23  6:13 AM   Result Value Ref Range    Phosphorus 6.0 (H) 2.5 - 4.9 mg/dL   Magnesium    Collection Time: 06/21/23  6:13 AM   Result Value Ref Range    Magnesium 2.80 (H) 1.80 - 2.40 mg/dL   CBC with Auto Differential    Collection Time: 06/21/23  6:13 AM   Result Value Ref Range    WBC 21.4 (H) 4.0 - 11.0 K/uL    RBC 4.22 4.20 - 5.90 M/uL    Hemoglobin 9.7 (L) 13.5 - 17.5 g/dL    Hematocrit 30.4 (L) 40.5 - 52.5 %    MCV 72.0 (L) 80.0 - 100.0 fL    MCH 23.0 (L) 26.0 - 34.0 pg    MCHC 31.9 31.0 - 36.0 g/dL    RDW 18.8 (H) 12.4 - 15.4 %    Platelets 66 (L) 818 - 450 K/uL    MPV 8.4 5.0 - 10.5 fL    Neutrophils % 97.7 %    Lymphocytes % 1.7 %    Monocytes % 0.5 %    Eosinophils % 0.1 %    Basophils % 0.0 %    Neutrophils Absolute 21.0 (H) 1.7 - 7.7 K/uL    Lymphocytes Absolute 0.4 (L) 1.0 - 5.1 K/uL    Monocytes Absolute 0.1 0.0 - 1.3 K/uL    Eosinophils Absolute 0.0 0.0 - 0.6 K/uL    Basophils Absolute 0.0 0.0 - 0.2 K/uL   Ammonia    Collection Time: 06/21/23 10:04 AM   Result Value Ref Range    Ammonia 64 (H) 16 - 60 umol/L   Renal Function Panel    Collection Time: 06/21/23 10:04 AM   Result Value Ref Range    Sodium 134
encephalopathy  Likely multifactorial       FREDERICK, non-oliguric      Thrombocytopenia      Leukocytosis - suspect this is steroid mediated     NKDA         Discussed with wife, dtr, all questions answered  They are still conflicted on [address]. Patient has been consistent his adult life in not wanting this type of medical intervention     We discussed MRI brain that may or may not provide any insight, with potential benefit of revealing CVA or some non-reversible pathology that might aid in their decision making. Wife states \"I don't want him here any more, I said my goodbyes,\" but deferring to dtr on decision to continue interventions, to give her \"more time. \"   Josh Galo dtr will consider MRI in larger context.        D/w RN, Fátima Mae MD  Phone: [phone]   Fax : [phone]
infiltrates with high AA gradient  Patient may benefit from a bronchoscopy for diagnostic purposes if patient's family is agreeable  Patient will benefit from a low-dose IV steroids which has been started  Patient continues to be on Precedex infusion and the requirements have gone up  Patient is a CT of the head shows patient to have some chronic small vessel ischemic disease along with diffuse ventricular prominence which may be related to central volume loss or comfort of NPH  Patient may require MRI of the head-no discrete lesions to suggest any infective process in the brain or CT of the head  Stool for occult blood ordered  Trend the platelet counts closely  Monitor input output and BMP  Correct electrolytes on minimalistic basis  Trend the fever curve  Monitor hemodynamics and cardiac rhythm closely  IV PPI to continue    Case discussed with ICU team    Patient clinical status is precarious and critical and has potential for further decompensation and needs to monitor closely in the ICU    Critical care time spent with patient was 35 minutes exclusive of any procedures        Electronically signed by:  Mann Farfan MD    6/21/2023    8:24 PM.

## 2023-06-23 NOTE — DISCHARGE SUMMARY
Labs     06/21/23  1004 06/22/23  0449 06/23/23  0432   * 143 150*   K 3.9 4.0 4.2    107 113*   CO2 18* 19* 24   BUN 77* 90* 89*   CREATININE 2.0* 2.1* 1.8*   GLUCOSE 119* 138* 173*     Hepatic: No results for input(s): AST, ALT, ALB, BILITOT, ALKPHOS in the last 72 hours. Lipids:   Lab Results   Component Value Date/Time    CHOL 242 07/22/2013 06:05 AM    HDL 35 07/22/2013 06:05 AM    TRIG 401 07/22/2013 06:05 AM     Hemoglobin A1C: No results found for: LABA1C  TSH:   Lab Results   Component Value Date/Time    TSH 0.59 06/20/2023 06:35 AM     Troponin: No results found for: TROPONINT  Lactic Acid:   Recent Labs     06/21/23  1353   LACTA 2.2*     BNP: No results for input(s): PROBNP in the last 72 hours. UA:  Lab Results   Component Value Date/Time    NITRU Negative 06/19/2023 11:14 PM    COLORU Yellow 06/19/2023 11:14 PM    PHUR 5.5 06/19/2023 11:14 PM    WBCUA 6-9 06/19/2023 11:14 PM    RBCUA 21-50 06/19/2023 11:14 PM    BACTERIA Rare 06/19/2023 11:14 PM    CLARITYU Clear 06/19/2023 11:14 PM    SPECGRAV >=1.030 06/19/2023 11:14 PM    LEUKOCYTESUR TRACE 06/19/2023 11:14 PM    UROBILINOGEN 0.2 06/19/2023 11:14 PM    BILIRUBINUR Negative 06/19/2023 11:14 PM    BLOODU LARGE 06/19/2023 11:14 PM    GLUCOSEU Negative 06/19/2023 11:14 PM    KETUA Negative 06/19/2023 11:14 PM    AMORPHOUS Rare 06/19/2023 11:14 PM     Urine Cultures: No results found for: LABURIN  Blood Cultures:   Lab Results   Component Value Date/Time    [facility]  06/22/2023 04:48 AM     No Growth to date. Any change in status will be called. Lab Results   Component Value Date/Time    BLOODCULT2  06/22/2023 04:48 AM     No Growth to date. Any change in status will be called.      Organism:   Lab Results   Component Value Date/Time    ORG [place] albicans 06/20/2023 01:08 PM       Time Spent Discharging patient 40  minutes    Electronically signed by Dolly Jameson MD on 6/23/2023 at 6:46 PM

## 2023-06-23 NOTE — CARE COORDINATION
CASE MANAGEMENT DISCHARGE SUMMARY      Discharge to: [facility] services at [facility]    Transportation:       Medical Transport explained to pt/family. Pt/family voice no agency preference. Agency used: [facility]  up time: 2000   Ambulance form completed: Yes    Confirmed discharge plan with:     Patient: yes per Natalie Schmidt with [facility]     Family:  yes   Name: Estrada Olvera (Spouse) Contact number:[phone] per Natalie Gayathri with [address] [place] [address]       Facility/Agency, name:      [address] [place] [address] faxed   Phone number for report to facility: Question concerning DCP Natalie [facility] [phone]. RN, name: Freedom Spence RN    Note: Discharging nurse to complete HARLEY, reconcile AVS, and place final copy with patient's discharge packet. RN to ensure that written prescriptions for  Level II medications are sent with patient to the facility as per protocol.

## 2023-06-23 NOTE — CARE COORDINATION
BHARATI scheduled transport with [facility] at 2000 to take patient to [facility]. [id number] with [address] and she will notify patient/patients family and patients RN.

## 2023-06-26 LAB
BACTERIA BLD CULT ORG #2: NORMAL
BACTERIA BLD CULT: NORMAL

## 2023-07-04 LAB
ACID FAST STN SPEC QL: NORMAL
MYCOBACTERIUM SPEC CULT: NORMAL

## 2023-07-11 LAB
ACID FAST STN SPEC QL: NORMAL
MYCOBACTERIUM SPEC CULT: NORMAL

## 2023-07-18 LAB
ACID FAST STN SPEC QL: NORMAL
MYCOBACTERIUM SPEC CULT: NORMAL

## 2023-07-24 LAB
FUNGUS SPEC CULT: ABNORMAL
LOEFFLER MB STN SPEC: ABNORMAL
ORGANISM: ABNORMAL

## 2023-07-25 LAB
ACID FAST STN SPEC QL: NORMAL
MYCOBACTERIUM SPEC CULT: NORMAL

## 2023-08-01 LAB
ACID FAST STN SPEC QL: NORMAL
MYCOBACTERIUM SPEC CULT: NORMAL

## 2023-08-08 LAB
ACID FAST STN SPEC QL: NORMAL
MYCOBACTERIUM SPEC CULT: NORMAL

## (undated) DEVICE — SYRINGE MED 10ML SLIP TIP BLNT FILL AND LUERLOCK DISP

## (undated) DEVICE — TUBING, SUCTION, 3/16" X 6', STRAIGHT: Brand: MEDLINE

## (undated) DEVICE — TRAP,MUCUS SPECIMEN, 80CC: Brand: MEDLINE

## (undated) DEVICE — CONMED SCOPE SAVER BITE BLOCK, 20X27 MM: Brand: SCOPE SAVER

## (undated) DEVICE — SYRINGE MED 50ML LUERSLIP TIP

## (undated) DEVICE — FRAME EYEWR PROTCT ASST CLR DISP SAFEVIEW

## (undated) DEVICE — LINER,SOFT,SUCTION CANISTER,1500CC: Brand: MEDLINE

## (undated) DEVICE — YANKAUER,BULB TIP,W/O VENT,RIGID,STERILE: Brand: MEDLINE

## (undated) DEVICE — SINGLE USE SUCTION VALVE MAJ-209: Brand: SINGLE USE SUCTION VALVE (STERILE)

## (undated) DEVICE — CANNULA,OXY,ADULT,SUPERSOFT,W/7'TUB,SC: Brand: MEDLINE INDUSTRIES, INC.

## (undated) DEVICE — SINGLE USE BIOPSY VALVE MAJ-210: Brand: SINGLE USE BIOPSY VALVE (STERILE)

## (undated) DEVICE — TUBING, SUCTION, 3/16" X 12', STRAIGHT: Brand: MEDLINE

## (undated) DEVICE — BOWL MED M 16OZ PLAS CAP GRAD

## (undated) DEVICE — ELECTRODE,RADIOTRANSLUCENT,FOAM,3PK: Brand: MEDLINE